# Patient Record
Sex: FEMALE | Race: WHITE | NOT HISPANIC OR LATINO | Employment: FULL TIME | URBAN - NONMETROPOLITAN AREA
[De-identification: names, ages, dates, MRNs, and addresses within clinical notes are randomized per-mention and may not be internally consistent; named-entity substitution may affect disease eponyms.]

---

## 2023-08-30 ENCOUNTER — OFFICE VISIT (OUTPATIENT)
Dept: UROLOGY | Facility: CLINIC | Age: 48
End: 2023-08-30
Payer: COMMERCIAL

## 2023-08-30 ENCOUNTER — HOSPITAL ENCOUNTER (OUTPATIENT)
Dept: GENERAL RADIOLOGY | Facility: HOSPITAL | Age: 48
Discharge: HOME | End: 2023-08-30
Admitting: NURSE PRACTITIONER
Payer: COMMERCIAL

## 2023-08-30 VITALS
SYSTOLIC BLOOD PRESSURE: 123 MMHG | HEIGHT: 66 IN | BODY MASS INDEX: 23.75 KG/M2 | HEART RATE: 86 BPM | DIASTOLIC BLOOD PRESSURE: 85 MMHG | WEIGHT: 147.8 LBS

## 2023-08-30 DIAGNOSIS — R10.30 LOWER ABDOMINAL PAIN: ICD-10-CM

## 2023-08-30 DIAGNOSIS — B37.31 YEAST VAGINITIS: ICD-10-CM

## 2023-08-30 DIAGNOSIS — N30.00 ACUTE CYSTITIS WITHOUT HEMATURIA: ICD-10-CM

## 2023-08-30 DIAGNOSIS — N32.81 OAB (OVERACTIVE BLADDER): ICD-10-CM

## 2023-08-30 DIAGNOSIS — R35.0 FREQUENCY OF URINATION: ICD-10-CM

## 2023-08-30 DIAGNOSIS — N39.0 RECURRENT UTI (URINARY TRACT INFECTION): Primary | ICD-10-CM

## 2023-08-30 DIAGNOSIS — N32.81 DETRUSOR INSTABILITY OF BLADDER: ICD-10-CM

## 2023-08-30 LAB
BILIRUB BLD-MCNC: NEGATIVE MG/DL
CLARITY, POC: CLEAR
COLOR UR: YELLOW
EXPIRATION DATE: NORMAL
GLUCOSE UR STRIP-MCNC: NEGATIVE MG/DL
KETONES UR QL: NEGATIVE
LEUKOCYTE EST, POC: NEGATIVE
Lab: NORMAL
NITRITE UR-MCNC: NEGATIVE MG/ML
PH UR: 6 [PH] (ref 5–8)
PROT UR STRIP-MCNC: NEGATIVE MG/DL
RBC # UR STRIP: NEGATIVE /UL
SP GR UR: 1.01 (ref 1–1.03)
UROBILINOGEN UR QL: NORMAL

## 2023-08-30 PROCEDURE — 81003 URINALYSIS AUTO W/O SCOPE: CPT | Performed by: NURSE PRACTITIONER

## 2023-08-30 PROCEDURE — 87186 SC STD MICRODIL/AGAR DIL: CPT | Performed by: NURSE PRACTITIONER

## 2023-08-30 PROCEDURE — 87086 URINE CULTURE/COLONY COUNT: CPT | Performed by: NURSE PRACTITIONER

## 2023-08-30 PROCEDURE — 99204 OFFICE O/P NEW MOD 45 MIN: CPT | Performed by: NURSE PRACTITIONER

## 2023-08-30 PROCEDURE — 74018 RADEX ABDOMEN 1 VIEW: CPT

## 2023-08-30 NOTE — PROGRESS NOTES
Chief Complaint  Recurrent uti's, Dysuria, and Urinary Urgency (New patient WITH UTI/OAB/DI/)    Subjective          Madhavi Loving presents to [facility] for UTI/OAB/DI  Dysuria   Associated symptoms include frequency and urgency. Pertinent negatives include no chills, flank pain, hematuria, nausea or vomiting.     Mrs. Gail Loving is a VERY pleasant 48-year-old female who presents to clinic today for evaluation as a new patient consult. The patient has been referred to clinic by her PCP with concerns of frequent UTIs and dysuria. ON Initial evaluation in clinic, patient reports numerous other concerns including :Overactive bladder/detrusor instability complicated by her frequency and urgency symptoms. She reports constant bloating and lower abdominal pain and discomfort.  She reports pelvic pressure and suprapubic discomfort. Denies flank pain, back pain. She does not have any CVA tenderness. Denies any episodes of gross hematuria. She denies any episodes of urinary incontinence, or vaginal discharge.     THE  Patient reports the use of multiple antibiotics in the past, initially for bacterial infections in her abdomen with chronic diarrhea, and most recently completing multiple therapy beginning with Macrobid, cefdinir, and  Augmentin 875 mg which she reports completing on Sunday 08/27/2023, for positive Klebsiella pneumonia UTI.  Patient reports, She took nitrofurantoin before she got her culture results. She went to urgent care and they switched her from Macrobid to cefdinir, which did not work and then subsequently to Augmentin.      Did contact patient's PCP's office for urine culture results and there is one MORE documented urine culture from [facility] completed on 08/17/2023 which showed beta-hemolytic Streptococcus group B. There are no other documented positive urine cultures on file. THE patient confirms 5 other Negative urine cultures for which she  HAD been symptomatic.  Her symptoms include frequency, urgency, and pressure. She has to urinate very often. She has bloating.  Prior to this, patient reports she had UTIs every other year. She denies any dysuria or vaginal discharge. Her urinalysis on initial evaluation in clinic today is completely negative for leukocyte esterase, is negative for nitrite, is negative for gross/trace microscopic hematuria. Her PVR- postvoid residual is 0 mL.    The patient reports she had diarrhea for 3 years straight in 2015 while she was stationed in [facility], and subsequently in [place] .  She stopped drinking coffee 6 months ago. She has had diarrhea for 6 months. She has not had any stool cultures since 2020. She has had cryptosporidium. She has had E. coli in the past.    She has an ultrasound of her abdomen and pelvis tomorrow. She denies any gross hematuria or hematochezia. She has not had any CT scans. She had an appointment with her OBGYN 1 month ago and she was told she had a fibroid. She has 1 male child. She denies any incontinence episodes. She drinks 3 to 4 16 ounce bottles of water a day. She urinates every 1 to 2 hours. She does not always urinate when she is frequent at home. She has discomfort in her bladder. She denies any procedures on her bladder besides her OBGYN. She denies any history of kidney stones.She is not diabetic.    The patient reports she has not had an H. pylori test. She mentions she does not have a GI specialist. She rates her pain as a 4 out of 10. She is taking Pyridium. She reports she has discomfort during intercourse. She denies any family history of bladder cancer, breast cancer, or uterine cancer.  She has had negative Pap smears and in the past.    She is relatively healthy with no significant PMHx.    Patient completed a KUB which is also unremarkable.  2 views of her abdomen with no suspicious calcifications.  The source of patient's discomfort not noted on KUB.   "Encouraged to proceed with ultrasound as recommended by her PCP.    IMPRESSION:  No suspicious calcifications identified on today's study.    Active Ambulatory Problems     Diagnosis Date Noted    Recurrent UTI (urinary tract infection) 08/31/2023    Acute cystitis without hematuria 08/31/2023    Detrusor instability of bladder 08/31/2023    Frequency of urination 08/31/2023    Lower abdominal pain 08/31/2023    Yeast vaginitis 08/31/2023     Resolved Ambulatory Problems     Diagnosis Date Noted    No Resolved Ambulatory Problems     Past Medical History:   Diagnosis Date    Recurrent UTI       Objective   Vital Signs:   /85   Pulse 86   Ht 167.6 cm (66\")   Wt 67 kg (147 lb 12.8 oz)   BMI 23.86 kg/mý       ROS:   Review of Systems   Constitutional:  Positive for activity change, appetite change and fatigue. Negative for chills, diaphoresis, fever, unexpected weight gain and unexpected weight loss.   HENT:  Negative for congestion, ear discharge, ear pain, nosebleeds, rhinorrhea, sinus pressure and sore throat.    Eyes:  Negative for blurred vision, double vision, photophobia, pain, redness and visual disturbance.   Respiratory:  Negative for apnea, cough, chest tightness, shortness of breath, wheezing and stridor.    Cardiovascular:  Negative for chest pain and palpitations.   Gastrointestinal:  Positive for abdominal pain and diarrhea. Negative for abdominal distention, constipation, nausea and vomiting.   Endocrine: Negative for polydipsia, polyphagia and polyuria.   Genitourinary:  Positive for dysuria, frequency, pelvic pain, pelvic pressure and urgency. Negative for decreased urine volume, difficulty urinating, dyspareunia, flank pain, genital sores, hematuria, urinary incontinence and vaginal discharge.   Musculoskeletal:  Positive for myalgias. Negative for arthralgias, back pain and joint swelling.   Skin:  Negative for pallor, rash and wound.   Neurological:  Negative for dizziness, tremors, " syncope, weakness, light-headedness, headache, memory problem and confusion.   Hematological:  Does not bruise/bleed easily.   Psychiatric/Behavioral:  Positive for sleep disturbance and stress. Negative for behavioral problems and decreased concentration. The patient is not nervous/anxious.       Physical Exam  Constitutional:       General: She is in acute distress.      Appearance: She is well-developed.   HENT:      Head: Normocephalic and atraumatic.   Eyes:      Pupils: Pupils are equal, round, and reactive to light.   Neck:      Thyroid: No thyromegaly.      Trachea: No tracheal deviation.   Cardiovascular:      Rate and Rhythm: Normal rate and regular rhythm.      Heart sounds: No murmur heard.  Pulmonary:      Effort: Pulmonary effort is normal. No respiratory distress.      Breath sounds: Normal breath sounds. No stridor. No wheezing.   Abdominal:      General: Bowel sounds are normal. There is distension.      Palpations: Abdomen is soft.      Tenderness: There is abdominal tenderness.   Genitourinary:     Labia:         Right: No tenderness.         Left: No tenderness.       Vagina: Normal. No vaginal discharge.      Comments: UURINE FREQUENCY/URGENCY/DYSURIA, pelvic pressure    Musculoskeletal:         General: Tenderness present. No deformity. Normal range of motion.      Cervical back: Normal range of motion.   Skin:     General: Skin is warm and dry.      Capillary Refill: Capillary refill takes less than 2 seconds.      Coloration: Skin is pale.      Findings: No erythema or rash.   Neurological:      Mental Status: She is alert and oriented to person, place, and time.      Cranial Nerves: No cranial nerve deficit.      Sensory: No sensory deficit.      Motor: Weakness present.      Coordination: Coordination normal.   Psychiatric:         Behavior: Behavior normal.         Thought Content: Thought content normal.         Judgment: Judgment normal.      Result Review :               8/30/2023     15:35   Urinalysis   Ketones, UA Negative    Leukocytes, UA Negative      Urine Culture          8/30/2023    15:34   Urine Culture   Urine Culture 50,000 CFU/mL Escherichia coli  P      Details         P Preliminary result                    Assessment and Plan    Problem List Items Addressed This Visit          Gastrointestinal Abdominal     Lower abdominal pain    Relevant Orders    XR abdomen kub (Completed)    Ambulatory Referral to Gastroenterology       Genitourinary and Reproductive     Recurrent UTI (urinary tract infection) - Primary    Relevant Medications    nitrofurantoin, macrocrystal-monohydrate, (Macrobid) 100 MG capsule    fluconazole (DIFLUCAN) 150 MG tablet    ondansetron (Zofran) 4 MG tablet    Acute cystitis without hematuria    Relevant Medications    nitrofurantoin, macrocrystal-monohydrate, (Macrobid) 100 MG capsule    fluconazole (DIFLUCAN) 150 MG tablet    ondansetron (Zofran) 4 MG tablet    Other Relevant Orders    XR abdomen kub (Completed)    Detrusor instability of bladder    Relevant Orders    XR abdomen kub (Completed)    Frequency of urination    Relevant Medications    nitrofurantoin, macrocrystal-monohydrate, (Macrobid) 100 MG capsule    fluconazole (DIFLUCAN) 150 MG tablet    ondansetron (Zofran) 4 MG tablet    Other Relevant Orders    POC Urinalysis Dipstick, Automated (Completed)    Urine Culture - Urine, Urine, Random Void (Completed)    XR abdomen kub (Completed)    Yeast vaginitis    Relevant Medications    fluconazole (DIFLUCAN) 150 MG tablet     Other Visit Diagnoses       OAB (overactive bladder)        Relevant Orders    XR abdomen kub (Completed)                                               ASSESSMENT           Recurrent urinary tract infections/OverActiveBladder/DI     MS BERTHA GOMEZ IS A 48 YEAR OLD Very pleasant and energetic female evaluated in clinic today for  Recurrent UTI's , OAB/DI SYMPTOMS THAT HAVE BEEN ONGOING, and recently becoming very bothersome to  her. She is in NO apparent distress upon exam in clinic today and reports a remarkable improvement in her overall symptoms post 3 different abx(Macrobid, cefdinir, Augmentin), for acute cystitis without hematuria/Klebsiella pneumonia and group B strep.      She reports doing relatively better post her last antibiotic therapy, we discussed prophylaxis, and she would like to continue if indicated.  Upon RE-evaluation today, she has minimal frequency at times. However she still has frequency, urgency, pelvic pressure, suprapubic discomfort and dysuria which has remained very bothersome to her. Although  She is happy to be feeling better she states, she has bouts of chronic diarrhea > 3 years, worsening the last 6 months with pelvic pressure, bloating, and abdominal pain. She has occassional nausea, denies vomiting, denies chills or fevers. She DENIES having any episodes of perineal irritation and yeast. She denies back/Flank pain, she does not have CVA tenderness . She has not had any gross hematuria.  Her urinalysis today is completely negative for any leukocyte esterase, it is negative for nitrites, it is negative for gross/microscopic hematuria.  Her PVR is 0 mL.    Again,  We discussed the types of organisms that are found in the urinary tract indicating that the vast majority are results of the patient's own gastrointestinal timothy.  We discussed how many of the antibiotics that are utilized can actually exacerbate these infections by creating resistant organisms and there is only a very few antibiotics that are concentrated in the urine and do not affect the rectal reservoir nor cause recurrent yeast vaginitis.      We discussed the risk factors for recurrent infections being intercourse in younger patients and atrophic changes in older patients.  We discussed the symptoms that are found including pain, pressure, burning, frequency, urgency suprapubic pain and painful intercourse.  I discussed upper tract symptoms  including fevers, chills, and indicated the workup would be much more aggressive if the patient were to present with recurrent infections in the face of upper tract symptomatology such as fever. I discussed the history of vesicoureteral reflux in young patients and finally chronic renal scarring as a result of such.     Interstitial cystitis-we discussed the diagnosis and management of this condition.  I indicated that it was a multifactorial condition with multifactorial symptomatology, Including frequency, urgency, the sensation of urinary tract infection in the absence of a positive culture, sexual symptomatology including significant dyspareunia.  We discussed treatment options including the importance of making a clinical diagnosis and the cystoscopic findings including Hunner's ulcers etc.  We also discussed medical management including pharmacologic treatment such as amitriptyline, naturopathic treatments such as pumpkin oil and which more aggressive options of Botox injections as well as the relative risks and merits of this.  We also discussed the use of dietary manipulation including the over-the-counter product relief which basically decreases the acid in the urine and avoidance of ascitic-containing foods such as citrus is etc.    ALSO,  we discussed OAB/Detrusor Instability which is an  irritative bladder symptomatology most likely related to factors such as intake of bladder irritants, postinfectious irritation, prolapse, with a very large differential diagnosis.  The mainstay of treatment has been tight cholinergics which basically caused the bladder to have decreased contractility.  We have discussed the side effects of these treatments including dry mouth, double vision, and increasing constipation.          PLAN  We resent her urine for culture. I will call her with results if any bacteria growth.    Will discussed STARTING Macrobid 100 mg PO Daily -Suppressive Therapy IF POSITIVE BACTERIA.     We  discussed rechecking her urine dipstick in another 2 weeks    She HAS BEEN ENCOURAGED TO  increase her p.o. fluid intake to at least 1 to 2 L daily and avoid bladder irritants such as caffeine products, spicy foods, and citrusy foods.     I recommend concomitant probiotics with treatment with antibiotics to protect the rectal reservoir including over-the-counter yogurt preparations to jonas oral pills containing the appropriate probiotics. Patient reports the diligent use of Probiotics.    WE DISCUSSED STARTING MYRBETRIQ 25 MG daily for OAB/DI-DEFERRED    DISCUSSED LOWER TRACT INVESTIGATION, PT HAS BEEN SCHEDULED FOR CYSTOSCOPY ON 11/07/23 WITH ANTHONY CALZADA-RECURRENT UTI VS CYSTITIS VS OAB/DI    WE DISCUSSED UPPER TRACK VIA CT, DEFERRED, WILL GET KUB TODAY    WILL CALL PT WITH RESULTS AND DEFINITIVE PLAN OF CARE     ALSO GI REFERRAL FOR CHRONIC DIARRHEA X 3 YEARS    FOLLOW UP AS DISCUSSED, MAY RETURN SOONER IF NEED BE    Patient is agreeable to plan of care.    Patient reports that she is not currently experiencing any symptoms of urinary incontinence.    BMI is within normal parameters. No other follow-up for BMI required.    RADIOLOGY (CT AND/OR KUB):    CT Abdomen and Pelvis: No results found for this or any previous visit.     CT Stone Protocol: No results found for this or any previous visit.     KUB: No results found for this or any previous visit.       LABS (3 MONTHS):    Office Visit on 08/30/2023   Component Date Value Ref Range Status    Color 08/30/2023 Yellow  Yellow, Straw, Dark Yellow, Jade Final    Clarity, UA 08/30/2023 Clear  Clear Final    Specific Gravity  08/30/2023 1.010  1.005 - 1.030 Final    pH, Urine 08/30/2023 6.0  5.0 - 8.0 Final    Leukocytes 08/30/2023 Negative  Negative Final    Nitrite, UA 08/30/2023 Negative  Negative Final    Protein, POC 08/30/2023 Negative  Negative mg/dL Final    Glucose, UA 08/30/2023 Negative  Negative mg/dL Final    Ketones, UA 08/30/2023 Negative  Negative  Final    Urobilinogen, UA 08/30/2023 Normal  Normal, 0.2 E.U./dL Final    Bilirubin 08/30/2023 Negative  Negative Final    Blood, UA 08/30/2023 Negative  Negative Final    Lot Number 08/30/2023 n   Final    Expiration Date 08/30/2023 n   Final    Urine Culture 08/30/2023 50,000 CFU/mL Escherichia coli (A)   Preliminary        Smoking Cessation Counseling:  Never a smoker.  Patient does not currently use any tobacco products.     Follow Up   Return in about 2 months (around 11/7/2023) for Next scheduled follow up, RECURRENT UTI/DYSURIA/DETRUSOR I, With Dr. Curtis, Cystoscopy/URODYNAMICS.    Patient was given instructions and counseling regarding her condition or for health maintenance advice. Please see specific information pulled into the AVS if appropriate.          This document has been electronically signed by Griselda Cheng-Akwa, APRN   August 31, 2023 17:27 EDT      Dictated Utilizing Dragon Dictation: Part of this note may be an electronic transcription/translation of spoken language to printed text using the Dragon Dictation System.      Transcribed from ambient dictation for Griselda Cheng-Akwa, APRN by Alka Salvador.  08/30/23   17:24 EDT    Patient or patient representative verbalized consent to the visit recording.  I have personally performed the services described in this document as transcribed by the above individual, and it is both accurate and complete.

## 2023-08-30 NOTE — LETTER
August 31, 2023     Genie Yeung NP  [address] [place], [place] [address]    Patient: Madhavi Loving   YOB: 1975   Date of Visit: 8/30/2023       Dear Genie Yeung NP,    Thank you for referring Madhavi Loving to me for evaluation. Below is a copy of my consult note.    If you have questions, please do not hesitate to call me. I look forward to following Madhavi along with you.         Sincerely,        Griselda Cheng-Akwa, APRN        CC: No Recipients    Chief Complaint  Recurrent uti's, Dysuria, and Urinary Urgency (New patient WITH UTI/OAB/DI/)    Subjective     {CC  Problem List  Visit Diagnosis   Encounters  Notes  Medications  Labs  Result Review Imaging  Media :23}     Madhavi Loving presents to [facility] for UTI/OAB/DI  Dysuria   Associated symptoms include frequency and urgency. Pertinent negatives include no flank pain, hematuria, nausea or vomiting.     Mrs. Gail Loving is a pleasant 48-year-old female who presents to clinic today for evaluation as a new patient consult. The patient has been referred to clinic by her PCP with concerns of frequent UTIs and dysuria. Initial evaluation in clinic, patient reports the use of multiple antibiotics in the past, recently completed Augmentin 875 mg on Sunday 08/27/2023. Her urinalysis on initial evaluation in clinic is completely negative for leukocyte esterase, is negative for nitrite, is negative for gross/trace microscopic hematuria. Her PVR is 0 mL. Did contact patient's PCP's office for urine culture results and there is one documented urine culture from [facility] completed on 08/17/2023 which showed beta-hemolytic Streptococcus group B. There are no other documented positive urine cultures. the patient also reports numerous other concerns including overactive bladder/detrusor instability complicated by her frequency and urgency symptoms. She denies pelvic pressure and  suprapubic discomfort. Denies flank pain, back pain. She does not have any CVA tenderness. Denies any episodes of gross hematuria. She denies any episodes of urinary incontinence. Patient's postvoid residual is 0 mL.    The patient reports she had diarrhea for 3 years straight in 2015. She took nitrofurantoin before she got her culture results. She went to urgent care and they switched her from Macrobid to cefdinir, which did not work. She was treated for Klebsiella on 07/12/2023. Her symptoms include frequency, urgency, and pressure. She has to urinate. She has bloating. She has UTIs every other year. She denies any dysuria or vaginal discharge. She has had 6 positive urine cultures.    She stopped drinking coffee 6 months ago. She has had diarrhea for 6 months. She has not had any stool cultures since 2020. She has had cryptosporidium. She has had E. coli in the past.    She has an ultrasound of her abdomen and pelvis tomorrow. She denies any gross hematuria or hematochezia. She has not had any CT scans. She had an appointment with her OBGYN 1 month ago and she was told she had a fibroid. She has 1 male child. She denies any incontinence episodes. She drinks 3 to 4 16 ounce bottles of water a day. She urinates every 1 to 2 hours. She does not always urinate when she is frequent at home. She has discomfort in her bladder. She denies any procedures on her bladder besides her OBGYN. She denies any history of kidney stones.    She is not diabetic.    The patient reports she has not had an H. pylori test. She mentions she does not have a GI specialist. She rates her pain as a 4 out of 10. She is taking Pyridium. She reports she has discomfort during intercourse. She denies any family history of bladder cancer, breast cancer, or uterine cancer.    Active Ambulatory Problems     Diagnosis Date Noted    No Active Ambulatory Problems     Resolved Ambulatory Problems     Diagnosis Date Noted    No Resolved Ambulatory  "Problems     Past Medical History:   Diagnosis Date    Recurrent UTI       Objective   Vital Signs:   /85   Pulse 86   Ht 167.6 cm (66\")   Wt 67 kg (147 lb 12.8 oz)   BMI 23.86 kg/mý       ROS:   Review of Systems   Constitutional:  Negative for activity change, appetite change, diaphoresis, fatigue, fever, unexpected weight gain and unexpected weight loss.   HENT:  Negative for congestion, ear discharge, ear pain, nosebleeds, rhinorrhea, sinus pressure and sore throat.    Eyes:  Negative for blurred vision, double vision, photophobia, pain, redness and visual disturbance.   Respiratory:  Negative for apnea, cough, chest tightness, shortness of breath, wheezing and stridor.    Cardiovascular:  Negative for chest pain and palpitations.   Gastrointestinal:  Positive for abdominal pain and diarrhea. Negative for abdominal distention, constipation, nausea and vomiting.   Endocrine: Negative for polydipsia, polyphagia and polyuria.   Genitourinary:  Positive for dysuria, frequency and urgency. Negative for decreased urine volume, difficulty urinating, flank pain, hematuria, pelvic pain, pelvic pressure and urinary incontinence.   Musculoskeletal:  Negative for arthralgias, back pain and joint swelling.   Skin:  Negative for pallor, rash and wound.   Neurological:  Negative for dizziness, tremors, syncope, weakness, light-headedness, headache, memory problem and confusion.   Hematological:  Does not bruise/bleed easily.   Psychiatric/Behavioral:  Negative for behavioral problems. The patient is not nervous/anxious.       Physical Exam  Constitutional:       General: She is in acute distress.      Appearance: She is well-developed.   HENT:      Head: Normocephalic and atraumatic.   Eyes:      Pupils: Pupils are equal, round, and reactive to light.   Neck:      Thyroid: No thyromegaly.      Trachea: No tracheal deviation.   Cardiovascular:      Rate and Rhythm: Normal rate and regular rhythm.      Heart sounds: " No murmur heard.  Pulmonary:      Effort: Pulmonary effort is normal. No respiratory distress.      Breath sounds: Normal breath sounds. No stridor. No wheezing.   Abdominal:      General: Bowel sounds are normal.      Palpations: Abdomen is soft.      Tenderness: There is abdominal tenderness.   Genitourinary:     Labia:         Right: No tenderness.         Left: No tenderness.       Vagina: Normal. No vaginal discharge.      Comments: UURINE FREQUENCY/URGENCY/DYSURIA    Musculoskeletal:         General: Tenderness present. No deformity. Normal range of motion.      Cervical back: Normal range of motion.   Skin:     General: Skin is warm and dry.      Capillary Refill: Capillary refill takes less than 2 seconds.      Coloration: Skin is pale.      Findings: No erythema or rash.   Neurological:      Mental Status: She is alert and oriented to person, place, and time.      Cranial Nerves: No cranial nerve deficit.      Sensory: No sensory deficit.      Motor: Weakness present.      Coordination: Coordination normal.   Psychiatric:         Behavior: Behavior normal.         Thought Content: Thought content normal.         Judgment: Judgment normal.      Result Review :{ Labs  Result Review  Imaging  Med Tab  Media :23}   {The following data was reviewed by (Optional):[id number]}  LIDA          8/30/2023    15:35   Urinalysis   Ketones, UA Negative    Leukocytes, UA Negative        {Data reviewed (Optional):74179:::1}       Assessment and Plan {CC Problem List  Visit Diagnosis  ROS  Review (Popup)  Health Maintenance  Quality  BestPractice  Medications  SmartSets  SnapShot Encounters  Media :23}   Problem List Items Addressed This Visit    None  Visit Diagnoses       Acute cystitis without hematuria    -  Primary    Relevant Orders    XR abdomen kub    Detrusor instability of bladder        Relevant Orders    XR abdomen kub    Frequency of urination        Relevant Orders    POC Urinalysis Dipstick, Automated  (Completed)    Urine Culture - Urine, Urine, Random Void    XR abdomen kub    OAB (overactive bladder)        Relevant Orders    XR abdomen kub    Lower abdominal pain        Relevant Orders    XR abdomen kub    Ambulatory Referral to Gastroenterology                                               ASSESSMENT  Recurrent urinary tract infections/OverActiveBladder:     MS BERTHA GOMEZ IS A 48 YEAR OLD Very pleasant and energetic female evaluated in clinic today for  Recurrent UTI's , OAB/DI SYMPTOMS THAT HAVE BEEN ONGOING, and recently becoming very bothersome to her. She is in no apparent distress and reports a remarkable improvement in her overall symptoms post 3 different abx, however she still has frequency, urgency and dysuria which has remained very bothersome to her. Although  She is happy to be feeling better she states, she has bouts of chronic diarrhea > 3 years, pelvic pressure, bloating, and abdominal pain. She has occassional nausea, denies vomittibg, denies chills or fevers.  Her     She reports doing relatively better on her antibiotic prophylaxis, and would like to continue.  Upon evaluation today, she has minimal frequency at times.  However, she DENIES having any more episodes of perineal irritation and yeast, or urine urgency. She denies back/abdominal pain, flank pain, she does not have CVA tenderness or pain at her pubic area. She has no urinary symptoms of dysuria, or burning on urination. She has not had any gross hematuria. She denies N/V/D.     Again,  We discussed the types of organisms that are found in the urinary tract indicating that the vast majority are results of the patient's own gastrointestinal timothy.  We discussed how many of the antibiotics that are utilized can actually exacerbate these infections by creating resistant organisms and there is only a very few antibiotics that are concentrated in the urine and do not affect the rectal reservoir nor cause recurrent yeast vaginitis.       We discussed the risk factors for recurrent infections being intercourse in younger patients and atrophic changes in older patients.  We discussed the symptoms that are found including pain, pressure, burning, frequency, urgency suprapubic pain and painful intercourse.  I discussed upper tract symptoms including fevers, chills, and indicated the workup would be much more aggressive if the patient were to present with recurrent infections in the face of upper tract symptomatology such as fever. I discussed the history of vesicoureteral reflux in young patients and finally chronic renal scarring as a result of such.     Interstitial cystitis-we discussed the diagnosis and management of this condition.  I indicated that it was a multifactorial condition with multifactorial symptomatology, Including frequency, urgency, the sensation of urinary tract infection in the absence of a positive culture, sexual symptomatology including significant dyspareunia.  We discussed treatment options including the importance of making a clinical diagnosis and the cystoscopic findings including Hunner's ulcers etc.  We also discussed medical management including pharmacologic treatment such as amitriptyline, naturopathic treatments such as pumpkin oil and which more aggressive options of Botox injections as well as the relative risks and merits of this.  We also discussed the use of dietary manipulation including the over-the-counter product relief which basically decreases the acid in the urine and avoidance of ascitic-containing foods such as citrus is etc.            PLAN  We resent her urine for culture. I will call her with results if any bacteria growth.    Will START Macrobid 100 mg PO Daily -Suppressive Therapy.     She HAS BEEN ENCOURAGED TO  increase her p.o. fluid intake to at least 1 to 2 L daily and avoid bladder irritants such as caffeine products, spicy foods, and citrusy foods.     I recommend concomitant probiotics  with treatment with antibiotics to protect the rectal reservoir including over-the-counter yogurt preparations to jonas oral pills containing the appropriate probiotics. Patient reports the diligent use of Probiotics.    WE DISCUSSED STARTING MYRBETRIQ 25 MG daily for OAB-DEFERRED    DISCUSSED LOWER TRACT INVESTIGATION, PT HAS BEEN SCHEDULED FOR CYSTOSCOPY ON 11/07/23 WITH ANTHONY CALZADA-RECURRENT UTI VS CYSTITIS VS OAB/DI    WE DISCUSSED UPPER TRACK VIAS CT, DEFERRED, WILL GET KUB TODAY    WILL CALL PT WITH RESULTS AND DEFINITIVE PLAN OF CARE     ALSO GI REFERRAL FOR CHRONIC DIARRHEA X 3 YEARS    FOLLOW UP AS DISCUSSED, MAY RETURN SOONER IF NEED BE    Patient is agreeable to plan of care.    Patient reports that she is not currently experiencing any symptoms of urinary incontinence.    BMI is within normal parameters. No other follow-up for BMI required.    RADIOLOGY (CT AND/OR KUB):    CT Abdomen and Pelvis: No results found for this or any previous visit.     CT Stone Protocol: No results found for this or any previous visit.     KUB: No results found for this or any previous visit.       LABS (3 MONTHS):    Office Visit on 08/30/2023   Component Date Value Ref Range Status    Color 08/30/2023 Yellow  Yellow, Straw, Dark Yellow, Jade Final    Clarity, UA 08/30/2023 Clear  Clear Final    Specific Gravity  08/30/2023 1.010  1.005 - 1.030 Final    pH, Urine 08/30/2023 6.0  5.0 - 8.0 Final    Leukocytes 08/30/2023 Negative  Negative Final    Nitrite, UA 08/30/2023 Negative  Negative Final    Protein, POC 08/30/2023 Negative  Negative mg/dL Final    Glucose, UA 08/30/2023 Negative  Negative mg/dL Final    Ketones, UA 08/30/2023 Negative  Negative Final    Urobilinogen, UA 08/30/2023 Normal  Normal, 0.2 E.U./dL Final    Bilirubin 08/30/2023 Negative  Negative Final    Blood, UA 08/30/2023 Negative  Negative Final    Lot Number 08/30/2023 n   Final    Expiration Date 08/30/2023 n   Final        Smoking  "Cessation Counseling:  Never a smoker.  Patient does not currently use any tobacco products.     During this visit, I spent *** minutes counseling Madhavi regarding tobacco cessation.    {Time Spent (Optional):81884}    Follow Up {Instructions Charge Capture  Follow-up Communications :23}  No follow-ups on file.    Patient was given instructions and counseling regarding her condition or for health maintenance advice. Please see specific information pulled into the AVS if appropriate.          This document has been electronically signed by Griselda Cheng-Akwa, APRN   August 30, 2023 16:35 EDT      Dictated Utilizing Dragon Dictation: Part of this note may be an electronic transcription/translation of spoken language to printed text using the Dragon Dictation System.               Transcribed from ambient dictation for Griselda Cheng-Akwa, APRN by Alka Salvador.  08/30/23   17:24 EDT    {LATISHA Provider Statement:18352::\"Patient or patient representative verbalized consent to the visit recording.\",\"I have personally performed the services described in this document as transcribed by the above individual, and it is both accurate and complete.\"}       "

## 2023-08-30 NOTE — LETTER
August 31, 2023     Genie Yeung NP  [address] [place] [address]    Patient: Madhavi Loving   YOB: 1975   Date of Visit: 8/30/2023       Dear Genie Yeung NP,    Thank you for referring Madhavi Loving to me for evaluation. Below is a copy of my consult note.    If you have questions, please do not hesitate to call me. I look forward to following Madhavi along with you.         Sincerely,        Griselda Cheng-Akwa, APRN        CC: No Recipients    Chief Complaint  Recurrent uti's, Dysuria, and Urinary Urgency (New patient WITH UTI/OAB/DI/)    Subjective          Madhavi Loving presents to [facility] for UTI/OAB/DI  Dysuria   Associated symptoms include frequency and urgency. Pertinent negatives include no chills, flank pain, hematuria, nausea or vomiting.     Mrs. Gail Loving is a VERY pleasant 48-year-old female who presents to clinic today for evaluation as a new patient consult. The patient has been referred to clinic by her PCP with concerns of frequent UTIs and dysuria. ON Initial evaluation in clinic, patient reports numerous other concerns including :Overactive bladder/detrusor instability complicated by her frequency and urgency symptoms. She reports constant bloating and lower abdominal pain and discomfort.  She reports pelvic pressure and suprapubic discomfort. Denies flank pain, back pain. She does not have any CVA tenderness. Denies any episodes of gross hematuria. She denies any episodes of urinary incontinence, or vaginal discharge.     THE  Patient reports the use of multiple antibiotics in the past, initially for bacterial infections in her abdomen with chronic diarrhea, and most recently completing multiple therapy beginning with Macrobid, cefdinir, and  Augmentin 875 mg which she reports completing on Sunday 08/27/2023, for positive Klebsiella pneumonia UTI.  Patient reports, She took nitrofurantoin before she got  her culture results. She went to urgent care and they switched her from Macrobid to cefdinir, which did not work and then subsequently to Augmentin.      Did contact patient's PCP's office for urine culture results and there is one MORE documented urine culture from [facility] completed on 08/17/2023 which showed beta-hemolytic Streptococcus group B. There are no other documented positive urine cultures on file. THE patient confirms 5 other Negative urine cultures for which she HAD been symptomatic.  Her symptoms include frequency, urgency, and pressure. She has to urinate very often. She has bloating.  Prior to this, patient reports she had UTIs every other year. She denies any dysuria or vaginal discharge. Her urinalysis on initial evaluation in clinic today is completely negative for leukocyte esterase, is negative for nitrite, is negative for gross/trace microscopic hematuria. Her PVR- postvoid residual is 0 mL.    The patient reports she had diarrhea for 3 years straight in 2015 while she was stationed in [facility], and subsequently in [place] .  She stopped drinking coffee 6 months ago. She has had diarrhea for 6 months. She has not had any stool cultures since 2020. She has had cryptosporidium. She has had E. coli in the past.    She has an ultrasound of her abdomen and pelvis tomorrow. She denies any gross hematuria or hematochezia. She has not had any CT scans. She had an appointment with her OBGYN 1 month ago and she was told she had a fibroid. She has 1 male child. She denies any incontinence episodes. She drinks 3 to 4 16 ounce bottles of water a day. She urinates every 1 to 2 hours. She does not always urinate when she is frequent at home. She has discomfort in her bladder. She denies any procedures on her bladder besides her OBGYN. She denies any history of kidney stones.She is not diabetic.    The patient reports she has not had an H. pylori test. She mentions she does not have a  "GI specialist. She rates her pain as a 4 out of 10. She is taking Pyridium. She reports she has discomfort during intercourse. She denies any family history of bladder cancer, breast cancer, or uterine cancer.  She has had negative Pap smears and in the past.    She is relatively healthy with no significant PMHx.    Patient completed a KUB which is also unremarkable.  2 views of her abdomen with no suspicious calcifications.  The source of patient's discomfort not noted on KUB.  Encouraged to proceed with ultrasound as recommended by her PCP.    IMPRESSION:  No suspicious calcifications identified on today's study.    Active Ambulatory Problems     Diagnosis Date Noted    Recurrent UTI (urinary tract infection) 08/31/2023    Acute cystitis without hematuria 08/31/2023    Detrusor instability of bladder 08/31/2023    Frequency of urination 08/31/2023    Lower abdominal pain 08/31/2023    Yeast vaginitis 08/31/2023     Resolved Ambulatory Problems     Diagnosis Date Noted    No Resolved Ambulatory Problems     Past Medical History:   Diagnosis Date    Recurrent UTI       Objective   Vital Signs:   /85   Pulse 86   Ht 167.6 cm (66\")   Wt 67 kg (147 lb 12.8 oz)   BMI 23.86 kg/mý       ROS:   Review of Systems   Constitutional:  Positive for activity change, appetite change and fatigue. Negative for chills, diaphoresis, fever, unexpected weight gain and unexpected weight loss.   HENT:  Negative for congestion, ear discharge, ear pain, nosebleeds, rhinorrhea, sinus pressure and sore throat.    Eyes:  Negative for blurred vision, double vision, photophobia, pain, redness and visual disturbance.   Respiratory:  Negative for apnea, cough, chest tightness, shortness of breath, wheezing and stridor.    Cardiovascular:  Negative for chest pain and palpitations.   Gastrointestinal:  Positive for abdominal pain and diarrhea. Negative for abdominal distention, constipation, nausea and vomiting.   Endocrine: " Negative for polydipsia, polyphagia and polyuria.   Genitourinary:  Positive for dysuria, frequency, pelvic pain, pelvic pressure and urgency. Negative for decreased urine volume, difficulty urinating, dyspareunia, flank pain, genital sores, hematuria, urinary incontinence and vaginal discharge.   Musculoskeletal:  Positive for myalgias. Negative for arthralgias, back pain and joint swelling.   Skin:  Negative for pallor, rash and wound.   Neurological:  Negative for dizziness, tremors, syncope, weakness, light-headedness, headache, memory problem and confusion.   Hematological:  Does not bruise/bleed easily.   Psychiatric/Behavioral:  Positive for sleep disturbance and stress. Negative for behavioral problems and decreased concentration. The patient is not nervous/anxious.       Physical Exam  Constitutional:       General: She is in acute distress.      Appearance: She is well-developed.   HENT:      Head: Normocephalic and atraumatic.   Eyes:      Pupils: Pupils are equal, round, and reactive to light.   Neck:      Thyroid: No thyromegaly.      Trachea: No tracheal deviation.   Cardiovascular:      Rate and Rhythm: Normal rate and regular rhythm.      Heart sounds: No murmur heard.  Pulmonary:      Effort: Pulmonary effort is normal. No respiratory distress.      Breath sounds: Normal breath sounds. No stridor. No wheezing.   Abdominal:      General: Bowel sounds are normal. There is distension.      Palpations: Abdomen is soft.      Tenderness: There is abdominal tenderness.   Genitourinary:     Labia:         Right: No tenderness.         Left: No tenderness.       Vagina: Normal. No vaginal discharge.      Comments: UURINE FREQUENCY/URGENCY/DYSURIA, pelvic pressure    Musculoskeletal:         General: Tenderness present. No deformity. Normal range of motion.      Cervical back: Normal range of motion.   Skin:     General: Skin is warm and dry.      Capillary Refill: Capillary refill takes less than 2 seconds.       Coloration: Skin is pale.      Findings: No erythema or rash.   Neurological:      Mental Status: She is alert and oriented to person, place, and time.      Cranial Nerves: No cranial nerve deficit.      Sensory: No sensory deficit.      Motor: Weakness present.      Coordination: Coordination normal.   Psychiatric:         Behavior: Behavior normal.         Thought Content: Thought content normal.         Judgment: Judgment normal.      Result Review :     UA          8/30/2023    15:35   Urinalysis   Ketones, UA Negative    Leukocytes, UA Negative      Urine Culture          8/30/2023    15:34   Urine Culture   Urine Culture 50,000 CFU/mL Escherichia coli  P      Details         P Preliminary result                    Assessment and Plan    Problem List Items Addressed This Visit          Gastrointestinal Abdominal     Lower abdominal pain    Relevant Orders    XR abdomen kub (Completed)    Ambulatory Referral to Gastroenterology       Genitourinary and Reproductive     Recurrent UTI (urinary tract infection) - Primary    Relevant Medications    nitrofurantoin, macrocrystal-monohydrate, (Macrobid) 100 MG capsule    fluconazole (DIFLUCAN) 150 MG tablet    ondansetron (Zofran) 4 MG tablet    Acute cystitis without hematuria    Relevant Medications    nitrofurantoin, macrocrystal-monohydrate, (Macrobid) 100 MG capsule    fluconazole (DIFLUCAN) 150 MG tablet    ondansetron (Zofran) 4 MG tablet    Other Relevant Orders    XR abdomen kub (Completed)    Detrusor instability of bladder    Relevant Orders    XR abdomen kub (Completed)    Frequency of urination    Relevant Medications    nitrofurantoin, macrocrystal-monohydrate, (Macrobid) 100 MG capsule    fluconazole (DIFLUCAN) 150 MG tablet    ondansetron (Zofran) 4 MG tablet    Other Relevant Orders    POC Urinalysis Dipstick, Automated (Completed)    Urine Culture - Urine, Urine, Random Void (Completed)    XR abdomen kub (Completed)    Yeast vaginitis    Relevant  Medications    fluconazole (DIFLUCAN) 150 MG tablet     Other Visit Diagnoses       OAB (overactive bladder)        Relevant Orders    XR abdomen kub (Completed)                                               ASSESSMENT           Recurrent urinary tract infections/OverActiveBladder/DI     MS BERTHA GOMEZ IS A 48 YEAR OLD Very pleasant and energetic female evaluated in clinic today for  Recurrent UTI's , OAB/DI SYMPTOMS THAT HAVE BEEN ONGOING, and recently becoming very bothersome to her. She is in NO apparent distress upon exam in clinic today and reports a remarkable improvement in her overall symptoms post 3 different abx(Macrobid, cefdinir, Augmentin), for acute cystitis without hematuria/Klebsiella pneumonia and group B strep.      She reports doing relatively better post her last antibiotic therapy, we discussed prophylaxis, and she would like to continue if indicated.  Upon RE-evaluation today, she has minimal frequency at times. However she still has frequency, urgency, pelvic pressure, suprapubic discomfort and dysuria which has remained very bothersome to her. Although  She is happy to be feeling better she states, she has bouts of chronic diarrhea > 3 years, worsening the last 6 months with pelvic pressure, bloating, and abdominal pain. She has occassional nausea, denies vomiting, denies chills or fevers. She DENIES having any episodes of perineal irritation and yeast. She denies back/Flank pain, she does not have CVA tenderness . She has not had any gross hematuria.  Her urinalysis today is completely negative for any leukocyte esterase, it is negative for nitrites, it is negative for gross/microscopic hematuria.  Her PVR is 0 mL.    Again,  We discussed the types of organisms that are found in the urinary tract indicating that the vast majority are results of the patient's own gastrointestinal timothy.  We discussed how many of the antibiotics that are utilized can actually exacerbate these infections by  creating resistant organisms and there is only a very few antibiotics that are concentrated in the urine and do not affect the rectal reservoir nor cause recurrent yeast vaginitis.      We discussed the risk factors for recurrent infections being intercourse in younger patients and atrophic changes in older patients.  We discussed the symptoms that are found including pain, pressure, burning, frequency, urgency suprapubic pain and painful intercourse.  I discussed upper tract symptoms including fevers, chills, and indicated the workup would be much more aggressive if the patient were to present with recurrent infections in the face of upper tract symptomatology such as fever. I discussed the history of vesicoureteral reflux in young patients and finally chronic renal scarring as a result of such.     Interstitial cystitis-we discussed the diagnosis and management of this condition.  I indicated that it was a multifactorial condition with multifactorial symptomatology, Including frequency, urgency, the sensation of urinary tract infection in the absence of a positive culture, sexual symptomatology including significant dyspareunia.  We discussed treatment options including the importance of making a clinical diagnosis and the cystoscopic findings including Hunner's ulcers etc.  We also discussed medical management including pharmacologic treatment such as amitriptyline, naturopathic treatments such as pumpkin oil and which more aggressive options of Botox injections as well as the relative risks and merits of this.  We also discussed the use of dietary manipulation including the over-the-counter product relief which basically decreases the acid in the urine and avoidance of ascitic-containing foods such as citrus is etc.    ALSO,  we discussed OAB/Detrusor Instability which is an  irritative bladder symptomatology most likely related to factors such as intake of bladder irritants, postinfectious irritation, prolapse,  with a very large differential diagnosis.  The mainstay of treatment has been tight cholinergics which basically caused the bladder to have decreased contractility.  We have discussed the side effects of these treatments including dry mouth, double vision, and increasing constipation.          PLAN  We resent her urine for culture. I will call her with results if any bacteria growth.    Will discussed STARTING Macrobid 100 mg PO Daily -Suppressive Therapy IF POSITIVE BACTERIA.     We discussed rechecking her urine dipstick in another 2 weeks    She HAS BEEN ENCOURAGED TO  increase her p.o. fluid intake to at least 1 to 2 L daily and avoid bladder irritants such as caffeine products, spicy foods, and citrusy foods.     I recommend concomitant probiotics with treatment with antibiotics to protect the rectal reservoir including over-the-counter yogurt preparations to jonas oral pills containing the appropriate probiotics. Patient reports the diligent use of Probiotics.    WE DISCUSSED STARTING MYRBETRIQ 25 MG daily for OAB/DI-DEFERRED    DISCUSSED LOWER TRACT INVESTIGATION, PT HAS BEEN SCHEDULED FOR CYSTOSCOPY ON 11/07/23 WITH ANTHONY CALZADA-RECURRENT UTI VS CYSTITIS VS OAB/DI    WE DISCUSSED UPPER TRACK VIA CT, DEFERRED, WILL GET KUB TODAY    WILL CALL PT WITH RESULTS AND DEFINITIVE PLAN OF CARE     ALSO GI REFERRAL FOR CHRONIC DIARRHEA X 3 YEARS    FOLLOW UP AS DISCUSSED, MAY RETURN SOONER IF NEED BE    Patient is agreeable to plan of care.    Patient reports that she is not currently experiencing any symptoms of urinary incontinence.    BMI is within normal parameters. No other follow-up for BMI required.    RADIOLOGY (CT AND/OR KUB):    CT Abdomen and Pelvis: No results found for this or any previous visit.     CT Stone Protocol: No results found for this or any previous visit.     KUB: No results found for this or any previous visit.       LABS (3 MONTHS):    Office Visit on 08/30/2023   Component Date Value Ref  Range Status    Color 08/30/2023 Yellow  Yellow, Straw, Dark Yellow, Jade Final    Clarity, UA 08/30/2023 Clear  Clear Final    Specific Gravity  08/30/2023 1.010  1.005 - 1.030 Final    pH, Urine 08/30/2023 6.0  5.0 - 8.0 Final    Leukocytes 08/30/2023 Negative  Negative Final    Nitrite, UA 08/30/2023 Negative  Negative Final    Protein, POC 08/30/2023 Negative  Negative mg/dL Final    Glucose, UA 08/30/2023 Negative  Negative mg/dL Final    Ketones, UA 08/30/2023 Negative  Negative Final    Urobilinogen, UA 08/30/2023 Normal  Normal, 0.2 E.U./dL Final    Bilirubin 08/30/2023 Negative  Negative Final    Blood, UA 08/30/2023 Negative  Negative Final    Lot Number 08/30/2023 n   Final    Expiration Date 08/30/2023 n   Final    Urine Culture 08/30/2023 50,000 CFU/mL Escherichia coli (A)   Preliminary        Smoking Cessation Counseling:  Never a smoker.  Patient does not currently use any tobacco products.     Follow Up   Return in about 2 months (around 11/7/2023) for Next scheduled follow up, RECURRENT UTI/DYSURIA/DETRUSOR I, With Dr. Curtis, Cystoscopy/URODYNAMICS.    Patient was given instructions and counseling regarding her condition or for health maintenance advice. Please see specific information pulled into the AVS if appropriate.          This document has been electronically signed by Griselda Cheng-Akwa, APRN   August 31, 2023 17:27 EDT      Dictated Utilizing Dragon Dictation: Part of this note may be an electronic transcription/translation of spoken language to printed text using the Dragon Dictation System.      Transcribed from ambient dictation for Griselda Cheng-Akwa, APRN by Alka Salvador.  08/30/23   17:24 EDT    Patient or patient representative verbalized consent to the visit recording.  I have personally performed the services described in this document as transcribed by the above individual, and it is both accurate and complete.

## 2023-08-30 NOTE — LETTER
August 31, 2023     Genie Yeung NP  [address] [place], [place] [address]    Patient: Madhavi Loving   YOB: 1975   Date of Visit: 8/30/2023       Dear Genie Yeung NP,    Thank you for referring Madhavi Loving to me for evaluation. Below is a copy of my consult note.    If you have questions, please do not hesitate to call me. I look forward to following Madhavi along with you.         Sincerely,        Griselda Cheng-Akwa, APRN        CC: No Recipients    Chief Complaint  Recurrent uti's, Dysuria, and Urinary Urgency (New patient WITH UTI/OAB/DI/)    Subjective     {CC  Problem List  Visit Diagnosis   Encounters  Notes  Medications  Labs  Result Review Imaging  Media :23}     Madhavi Loving presents to [facility] for UTI/OAB/DI  Dysuria   Associated symptoms include frequency and urgency. Pertinent negatives include no flank pain, hematuria, nausea or vomiting.     Mrs. Gail Loving is a pleasant 48-year-old female who presents to clinic today for evaluation as a new patient consult. The patient has been referred to clinic by her PCP with concerns of frequent UTIs and dysuria. Initial evaluation in clinic, patient reports the use of multiple antibiotics in the past, recently completed Augmentin 875 mg on Sunday 08/27/2023. Her urinalysis on initial evaluation in clinic is completely negative for leukocyte esterase, is negative for nitrite, is negative for gross/trace microscopic hematuria. Her PVR is 0 mL. Did contact patient's PCP's office for urine culture results and there is one documented urine culture from [facility] completed on 08/17/2023 which showed beta-hemolytic Streptococcus group B. There are no other documented positive urine cultures. the patient also reports numerous other concerns including overactive bladder/detrusor instability complicated by her frequency and urgency symptoms. She denies pelvic pressure and  suprapubic discomfort. Denies flank pain, back pain. She does not have any CVA tenderness. Denies any episodes of gross hematuria. She denies any episodes of urinary incontinence. Patient's postvoid residual is 0 mL.    The patient reports she had diarrhea for 3 years straight in 2015. She took nitrofurantoin before she got her culture results. She went to urgent care and they switched her from Macrobid to cefdinir, which did not work. She was treated for Klebsiella on 07/12/2023. Her symptoms include frequency, urgency, and pressure. She has to urinate. She has bloating. She has UTIs every other year. She denies any dysuria or vaginal discharge. She has had 6 positive urine cultures.    She stopped drinking coffee 6 months ago. She has had diarrhea for 6 months. She has not had any stool cultures since 2020. She has had cryptosporidium. She has had E. coli in the past.    She has an ultrasound of her abdomen and pelvis tomorrow. She denies any gross hematuria or hematochezia. She has not had any CT scans. She had an appointment with her OBGYN 1 month ago and she was told she had a fibroid. She has 1 male child. She denies any incontinence episodes. She drinks 3 to 4 16 ounce bottles of water a day. She urinates every 1 to 2 hours. She does not always urinate when she is frequent at home. She has discomfort in her bladder. She denies any procedures on her bladder besides her OBGYN. She denies any history of kidney stones.    She is not diabetic.    The patient reports she has not had an H. pylori test. She mentions she does not have a GI specialist. She rates her pain as a 4 out of 10. She is taking Pyridium. She reports she has discomfort during intercourse. She denies any family history of bladder cancer, breast cancer, or uterine cancer.    Active Ambulatory Problems     Diagnosis Date Noted    No Active Ambulatory Problems     Resolved Ambulatory Problems     Diagnosis Date Noted    No Resolved Ambulatory  "Problems     Past Medical History:   Diagnosis Date    Recurrent UTI       Objective   Vital Signs:   /85   Pulse 86   Ht 167.6 cm (66\")   Wt 67 kg (147 lb 12.8 oz)   BMI 23.86 kg/mý       ROS:   Review of Systems   Constitutional:  Negative for activity change, appetite change, diaphoresis, fatigue, fever, unexpected weight gain and unexpected weight loss.   HENT:  Negative for congestion, ear discharge, ear pain, nosebleeds, rhinorrhea, sinus pressure and sore throat.    Eyes:  Negative for blurred vision, double vision, photophobia, pain, redness and visual disturbance.   Respiratory:  Negative for apnea, cough, chest tightness, shortness of breath, wheezing and stridor.    Cardiovascular:  Negative for chest pain and palpitations.   Gastrointestinal:  Positive for abdominal pain and diarrhea. Negative for abdominal distention, constipation, nausea and vomiting.   Endocrine: Negative for polydipsia, polyphagia and polyuria.   Genitourinary:  Positive for dysuria, frequency and urgency. Negative for decreased urine volume, difficulty urinating, flank pain, hematuria, pelvic pain, pelvic pressure and urinary incontinence.   Musculoskeletal:  Negative for arthralgias, back pain and joint swelling.   Skin:  Negative for pallor, rash and wound.   Neurological:  Negative for dizziness, tremors, syncope, weakness, light-headedness, headache, memory problem and confusion.   Hematological:  Does not bruise/bleed easily.   Psychiatric/Behavioral:  Negative for behavioral problems. The patient is not nervous/anxious.       Physical Exam  Constitutional:       General: She is in acute distress.      Appearance: She is well-developed.   HENT:      Head: Normocephalic and atraumatic.   Eyes:      Pupils: Pupils are equal, round, and reactive to light.   Neck:      Thyroid: No thyromegaly.      Trachea: No tracheal deviation.   Cardiovascular:      Rate and Rhythm: Normal rate and regular rhythm.      Heart sounds: No " murmur heard.  Pulmonary:      Effort: Pulmonary effort is normal. No respiratory distress.      Breath sounds: Normal breath sounds. No stridor. No wheezing.   Abdominal:      General: Bowel sounds are normal.      Palpations: Abdomen is soft.      Tenderness: There is abdominal tenderness.   Genitourinary:     Labia:         Right: No tenderness.         Left: No tenderness.       Vagina: Normal. No vaginal discharge.      Comments: UURINE FREQUENCY/URGENCY/DYSURIA    Musculoskeletal:         General: Tenderness present. No deformity. Normal range of motion.      Cervical back: Normal range of motion.   Skin:     General: Skin is warm and dry.      Capillary Refill: Capillary refill takes less than 2 seconds.      Coloration: Skin is pale.      Findings: No erythema or rash.   Neurological:      Mental Status: She is alert and oriented to person, place, and time.      Cranial Nerves: No cranial nerve deficit.      Sensory: No sensory deficit.      Motor: Weakness present.      Coordination: Coordination normal.   Psychiatric:         Behavior: Behavior normal.         Thought Content: Thought content normal.         Judgment: Judgment normal.      Result Review :{ Labs  Result Review  Imaging  Med Tab  Media :23}   {The following data was reviewed by (Optional):[id number]}  LIDA          8/30/2023    15:35   Urinalysis   Ketones, UA Negative    Leukocytes, UA Negative        {Data reviewed (Optional):53155:::1}       Assessment and Plan {CC Problem List  Visit Diagnosis  ROS  Review (Popup)  Health Maintenance  Quality  BestPractice  Medications  SmartSets  SnapShot Encounters  Media :23}   Problem List Items Addressed This Visit    None  Visit Diagnoses       Acute cystitis without hematuria    -  Primary    Relevant Orders    XR abdomen kub    Detrusor instability of bladder        Relevant Orders    XR abdomen kub    Frequency of urination        Relevant Orders    POC Urinalysis Dipstick, Automated  (Completed)    Urine Culture - Urine, Urine, Random Void    XR abdomen kub    OAB (overactive bladder)        Relevant Orders    XR abdomen kub    Lower abdominal pain        Relevant Orders    XR abdomen kub    Ambulatory Referral to Gastroenterology                                               ASSESSMENT  Recurrent urinary tract infections/OverActiveBladder:     MS BERTHA GOMEZ IS A 48 YEAR OLD Very pleasant and energetic female evaluated in clinic today for  Recurrent UTI's , OAB/DI SYMPTOMS THAT HAVE BEEN ONGOING, and recently becoming very bothersome to her. She is in no apparent distress and reports a remarkable improvement in her overall symptoms post 3 different abx, however she still has frequency, urgency and dysuria which has remained very bothersome to her. Although  She is happy to be feeling better she states, she has bouts of chronic diarrhea > 3 years, pelvic pressure, bloating, and abdominal pain. She has occassional nausea, denies vomittibg, denies chills or fevers.  Her     She reports doing relatively better on her antibiotic prophylaxis, and would like to continue.  Upon evaluation today, she has minimal frequency at times.  However, she DENIES having any more episodes of perineal irritation and yeast, or urine urgency. She denies back/abdominal pain, flank pain, she does not have CVA tenderness or pain at her pubic area. She has no urinary symptoms of dysuria, or burning on urination. She has not had any gross hematuria. She denies N/V/D.     Again,  We discussed the types of organisms that are found in the urinary tract indicating that the vast majority are results of the patient's own gastrointestinal timothy.  We discussed how many of the antibiotics that are utilized can actually exacerbate these infections by creating resistant organisms and there is only a very few antibiotics that are concentrated in the urine and do not affect the rectal reservoir nor cause recurrent yeast vaginitis.       We discussed the risk factors for recurrent infections being intercourse in younger patients and atrophic changes in older patients.  We discussed the symptoms that are found including pain, pressure, burning, frequency, urgency suprapubic pain and painful intercourse.  I discussed upper tract symptoms including fevers, chills, and indicated the workup would be much more aggressive if the patient were to present with recurrent infections in the face of upper tract symptomatology such as fever. I discussed the history of vesicoureteral reflux in young patients and finally chronic renal scarring as a result of such.     Interstitial cystitis-we discussed the diagnosis and management of this condition.  I indicated that it was a multifactorial condition with multifactorial symptomatology, Including frequency, urgency, the sensation of urinary tract infection in the absence of a positive culture, sexual symptomatology including significant dyspareunia.  We discussed treatment options including the importance of making a clinical diagnosis and the cystoscopic findings including Hunner's ulcers etc.  We also discussed medical management including pharmacologic treatment such as amitriptyline, naturopathic treatments such as pumpkin oil and which more aggressive options of Botox injections as well as the relative risks and merits of this.  We also discussed the use of dietary manipulation including the over-the-counter product relief which basically decreases the acid in the urine and avoidance of ascitic-containing foods such as citrus is etc.            PLAN  We resent her urine for culture. I will call her with results if any bacteria growth.    Will START Macrobid 100 mg PO Daily -Suppressive Therapy.     She HAS BEEN ENCOURAGED TO  increase her p.o. fluid intake to at least 1 to 2 L daily and avoid bladder irritants such as caffeine products, spicy foods, and citrusy foods.     I recommend concomitant probiotics  with treatment with antibiotics to protect the rectal reservoir including over-the-counter yogurt preparations to jonas oral pills containing the appropriate probiotics. Patient reports the diligent use of Probiotics.    WE DISCUSSED STARTING MYRBETRIQ 25 MG daily for OAB-DEFERRED    DISCUSSED LOWER TRACT INVESTIGATION, PT HAS BEEN SCHEDULED FOR CYSTOSCOPY ON 11/07/23 WITH ANTHONY CALZADA-RECURRENT UTI VS CYSTITIS VS OAB/DI    WE DISCUSSED UPPER TRACK VIAS CT, DEFERRED, WILL GET KUB TODAY    WILL CALL PT WITH RESULTS AND DEFINITIVE PLAN OF CARE     ALSO GI REFERRAL FOR CHRONIC DIARRHEA X 3 YEARS    FOLLOW UP AS DISCUSSED, MAY RETURN SOONER IF NEED BE    Patient is agreeable to plan of care.    Patient reports that she is not currently experiencing any symptoms of urinary incontinence.    BMI is within normal parameters. No other follow-up for BMI required.    RADIOLOGY (CT AND/OR KUB):    CT Abdomen and Pelvis: No results found for this or any previous visit.     CT Stone Protocol: No results found for this or any previous visit.     KUB: No results found for this or any previous visit.       LABS (3 MONTHS):    Office Visit on 08/30/2023   Component Date Value Ref Range Status    Color 08/30/2023 Yellow  Yellow, Straw, Dark Yellow, Jade Final    Clarity, UA 08/30/2023 Clear  Clear Final    Specific Gravity  08/30/2023 1.010  1.005 - 1.030 Final    pH, Urine 08/30/2023 6.0  5.0 - 8.0 Final    Leukocytes 08/30/2023 Negative  Negative Final    Nitrite, UA 08/30/2023 Negative  Negative Final    Protein, POC 08/30/2023 Negative  Negative mg/dL Final    Glucose, UA 08/30/2023 Negative  Negative mg/dL Final    Ketones, UA 08/30/2023 Negative  Negative Final    Urobilinogen, UA 08/30/2023 Normal  Normal, 0.2 E.U./dL Final    Bilirubin 08/30/2023 Negative  Negative Final    Blood, UA 08/30/2023 Negative  Negative Final    Lot Number 08/30/2023 n   Final    Expiration Date 08/30/2023 n   Final        Smoking Cessation  "Counseling:  Never a smoker.  Patient does not currently use any tobacco products.     During this visit, I spent *** minutes counseling Madhavi regarding tobacco cessation.    {Time Spent (Optional):08077}    Follow Up {Instructions Charge Capture  Follow-up Communications :23}  No follow-ups on file.    Patient was given instructions and counseling regarding her condition or for health maintenance advice. Please see specific information pulled into the AVS if appropriate.          This document has been electronically signed by Griselda Cheng-Akwa, APRN   August 30, 2023 16:35 EDT      Dictated Utilizing Dragon Dictation: Part of this note may be an electronic transcription/translation of spoken language to printed text using the Dragon Dictation System.               Transcribed from ambient dictation for Griselda Cheng-Akwa, APRN by Alka Salvador.  08/30/23   17:24 EDT    {LATISHA Provider Statement:47467::\"Patient or patient representative verbalized consent to the visit recording.\",\"I have personally performed the services described in this document as transcribed by the above individual, and it is both accurate and complete.\"}       "

## 2023-08-31 ENCOUNTER — TELEPHONE (OUTPATIENT)
Dept: UROLOGY | Facility: CLINIC | Age: 48
End: 2023-08-31
Payer: COMMERCIAL

## 2023-08-31 DIAGNOSIS — B96.20 E-COLI UTI: ICD-10-CM

## 2023-08-31 DIAGNOSIS — N39.0 E-COLI UTI: ICD-10-CM

## 2023-08-31 DIAGNOSIS — N30.00 ACUTE CYSTITIS WITHOUT HEMATURIA: Primary | ICD-10-CM

## 2023-08-31 PROBLEM — R10.30 LOWER ABDOMINAL PAIN: Status: ACTIVE | Noted: 2023-08-31

## 2023-08-31 PROBLEM — R35.0 FREQUENCY OF URINATION: Status: ACTIVE | Noted: 2023-08-31

## 2023-08-31 PROBLEM — N32.81 DETRUSOR INSTABILITY OF BLADDER: Status: ACTIVE | Noted: 2023-08-31

## 2023-08-31 PROBLEM — B37.31 YEAST VAGINITIS: Status: ACTIVE | Noted: 2023-08-31

## 2023-08-31 RX ORDER — NITROFURANTOIN 25; 75 MG/1; MG/1
100 CAPSULE ORAL 2 TIMES DAILY
Qty: 20 CAPSULE | Refills: 0 | Status: SHIPPED | OUTPATIENT
Start: 2023-08-31

## 2023-08-31 RX ORDER — NITROFURANTOIN 25; 75 MG/1; MG/1
CAPSULE ORAL
Qty: 56 CAPSULE | Refills: 3 | Status: SHIPPED | OUTPATIENT
Start: 2023-08-31

## 2023-08-31 RX ORDER — FLUCONAZOLE 150 MG/1
150 TABLET ORAL ONCE
Qty: 2 TABLET | Refills: 0 | Status: SHIPPED | OUTPATIENT
Start: 2023-08-31 | End: 2023-08-31

## 2023-08-31 RX ORDER — ONDANSETRON 4 MG/1
4 TABLET, FILM COATED ORAL EVERY 12 HOURS PRN
Qty: 20 TABLET | Refills: 0 | Status: SHIPPED | OUTPATIENT
Start: 2023-08-31

## 2023-08-31 NOTE — TELEPHONE ENCOUNTER
Called patient to let her know that her urine culture showed E coli and [facility]. Patient was told to drop off another urine culture in 2 weeks per Delbert.

## 2023-08-31 NOTE — TELEPHONE ENCOUNTER
Called patient to check on her post clinic visit and to discuss KUB results which are unremarkable at this time.  Patient's voicemail at phone #[phone] without any voicemail set up.  We will try to call again later.    FINDINGS:  2 views of the abdomen.     No suspicious calcifications are seen in the imaged area.     No evidence of bowel obstruction.     IMPRESSION:  No suspicious calcifications identified on today's study.

## 2023-09-01 ENCOUNTER — TELEPHONE (OUTPATIENT)
Dept: UROLOGY | Facility: CLINIC | Age: 48
End: 2023-09-01
Payer: COMMERCIAL

## 2023-09-01 DIAGNOSIS — N39.0 RECURRENT UTI (URINARY TRACT INFECTION): ICD-10-CM

## 2023-09-01 DIAGNOSIS — A49.8 INFECTION DUE TO NON-O157 SHIGA TOXIN-PRODUCING ESCHERICHIA COLI (E.COLI): Primary | ICD-10-CM

## 2023-09-01 LAB — BACTERIA SPEC AEROBE CULT: ABNORMAL

## 2023-09-01 RX ORDER — LEVOFLOXACIN 750 MG/1
750 TABLET ORAL DAILY
Qty: 7 TABLET | Refills: 0 | Status: SHIPPED | OUTPATIENT
Start: 2023-09-01 | End: 2023-09-08

## 2023-09-01 NOTE — TELEPHONE ENCOUNTER
Did speak with patient this morning regarding her urine culture results and sensitivity.  Patient was started on Macrobid yesterday taking 100 mg p.o. twice daily, x10 days after which she will transition to Macrobid prophylaxis nightly.  However patient was adamant about Macrobid states she used it initially and it was ineffective.  Patient is requesting something stronger.    We discussed the side effects of other medications, and the importance of continuing Macrobid for prophylactic therapy since IT IS based on the bladder biome.  Her UTI symptoms have been ongoing for over 8 weeks and she is post numerous antibiotic therapy already.      Nevertheless per patient's request her UTI will be treated with Levaquin 750 mg p.o. daily for 7 days instead.  After that we will recheck her urinalysis and then hopefully continue suppressive therapy with Macrobid if indicated.    We also discussed lower tract investigation via cystoscopy, patient has been scheduled    She also follow-up with GI with concerns of chronic diarrhea    Patient has been encouraged to increase her p.o. fluid intake, continue probiotics as discussed.    Patient appreciative of call and will continue therapy as discussed.    Urine Culture 50,000 CFU/mL Escherichia coli Abnormal          Colonization of the urinary tract without infection is common. Treatment is discouraged unless the patient is symptomatic, pregnant, or undergoing an invasive urologic procedure.        Resulting Agency:  [facility]     Susceptibility     Escherichia coli     KATE     Ampicillin Resistant     Ampicillin + Sulbactam Resistant     Cefazolin Susceptible     Cefepime Susceptible     Ceftazidime Susceptible     Ceftriaxone Susceptible     Gentamicin Susceptible     Levofloxacin Susceptible     Nitrofurantoin Susceptible     Piperacillin + Tazobactam Susceptible     Trimethoprim + Sulfamethoxazole Susceptible

## 2023-09-01 NOTE — TELEPHONE ENCOUNTER
Patient stated she has already taken macrobid in the past and it did not help. She is wanting to know if this was a mistake or does she need to take it again.

## 2023-09-05 NOTE — PROGRESS NOTES
DATE OF CONSULTATION:  2023    REASON FOR REFERRAL: Lower abdominal pain     REFERRING PHYSICIAN:  Griselda Cheng-Akwa, AP*    CHIEF COMPLAINT:  Right lower quadrant abdominal pain, diarrhea     HISTORY OF PRESENT ILLNESS:   Madhavi Loving is a  48 y.o. female who is being seen today at the request of Griselda Cheng-Akwa, AP* for evaluation and treatment of lower abdominal pain. For the past ~1 year, she reports she has been struggling with intermittent RLQ abdominal pain and diarrhea. At present, she reports having ~2-3 bowel movements per day with stool type 5-6 on BSS. Occasionally, ~ once per month, she will have stool type 7 on BSS. She has associated abdominal bloating and does not feel as if she is evacuating her colon well.  She denies having BRBPR or melena. Denies unusual weight loss. Denies family history of colon cancer. She has never had a screening colonoscopy.  Of note, she is also following with urology for management of recurrent UTIs. She says she has been on several antibiotics over the past few months. She is currently taking Levaquin and is concerned about the possibility of C-diff. She had recent KUB on 23 which showed moderate stool along with gas throughout. She has not tried over the counter stool softeners, laxatives or fiber supplement. She has no other complaints today.       PAST MEDICAL HISTORY:  Past Medical History:   Diagnosis Date    Recurrent UTI        PAST SURGICAL HISTORY:  Past Surgical History:   Procedure Laterality Date     SECTION         FAMILY HISTORY:  Family History   Problem Relation Age of Onset    Hypertension Father     No Known Problems Mother        SOCIAL HISTORY:  Social History     Socioeconomic History    Marital status:    Tobacco Use    Smoking status: Never    Smokeless tobacco: Never   Vaping Use    Vaping Use: Never used   Substance and Sexual Activity    Alcohol use: Never    Drug use: Never    Sexual activity: Yes     Partners:  "Male         MEDICATIONS:  The current medication list was reviewed in the EMR    Current Outpatient Medications:     levoFLOXacin (Levaquin) 750 MG tablet, Take 1 tablet by mouth Daily for 7 days., Disp: 7 tablet, Rfl: 0    nitrofurantoin, macrocrystal-monohydrate, (Macrobid) 100 MG capsule, Take 1 capsule by mouth 2 (Two) Times a Day., Disp: 20 capsule, Rfl: 0    nitrofurantoin, macrocrystal-monohydrate, (Macrobid) 100 MG capsule, TAKE 1 CAPSULE BY MOUTH TWICE DAILY X 10 DAYS, AFTER, CONTINUETO TAKE 1 CAPSULE NIGHTLY FOR SUPPRESSIVE THERAPY E'COLI RECURRENT UTIs, Disp: 56 capsule, Rfl: 3    ondansetron (Zofran) 4 MG tablet, Take 1 tablet by mouth Every 12 (Twelve) Hours As Needed for Nausea., Disp: 20 tablet, Rfl: 0    ALLERGIES:  No Known Allergies    REVIEW OF SYSTEMS:    A comprehensive 14 point review of systems was performed.  Significant findings as mentioned above.  All other systems reviewed and are negative.        Physical Exam   Vital Signs: /79 (BP Location: Left arm, Patient Position: Sitting, Cuff Size: Small Adult)   Pulse 76   Ht 167.6 cm (66\")   Wt 67.1 kg (148 lb)   BMI 23.89 kg/m²    General: Well developed, well nourished, alert and oriented x 3, in no acute distress.   Head: ATNC   Eyes: PERRL, No evidence of conjunctivitis.   Nose: No nasal discharge.   Mouth: Oral mucosal membranes moist. No oral ulceration or hemorrhages.   Neck: Neck supple. No thyromegaly. No JVD.   Lungs: Clear in all fields to A&P without rales, rhonchi or wheezing.   Heart: RRR. No murmurs, rubs, or gallops.   Abdomen: Soft. Bowel sounds are normoactive. Nontender with palpation.   Extremities: No cyanosis or edema.   Neurologic: Grossly non-focal exam    ASSESSMENT & PLAN:  Madhavi Loving is a  48 y.o. female with    1.  Constipation:  2. Right lower quadrant abdominal pain:    -Will start fibercon 1 tablet twice daily. Also recommended Miralax 1 capful daily prn to help improve bowel movements. Given gas " and abdominal bloating, also recommended Low fodmap diet and handout was provided today.   -Per patient request, will check stool for C-diff given recent antibiotic use, although this is unlikely based on history above which I discussed with patient today.   -Will have her RTC in ~8 weeks for symptom check. Once we improve bowel habits, would recommended screening colonoscopy which we also discussed today.     The patient was in agreement with the plan and all questions were answered to her satisfaction.     Thank you so much for allowing us to participate in the care of Madhavi Loving . Please do not hesitate to contact us with any questions or concerns.             Electronically Signed by: ALLA Hernandez , September 5, 2023 10:28 EDT       CC:   Griselda Cheng-Akwa, AP*  Genie Yeung NP

## 2023-09-06 ENCOUNTER — OFFICE VISIT (OUTPATIENT)
Dept: GASTROENTEROLOGY | Facility: CLINIC | Age: 48
End: 2023-09-06
Payer: COMMERCIAL

## 2023-09-06 ENCOUNTER — LAB (OUTPATIENT)
Dept: UROLOGY | Facility: CLINIC | Age: 48
End: 2023-09-06
Payer: COMMERCIAL

## 2023-09-06 VITALS
HEIGHT: 66 IN | WEIGHT: 148 LBS | SYSTOLIC BLOOD PRESSURE: 119 MMHG | DIASTOLIC BLOOD PRESSURE: 79 MMHG | HEART RATE: 76 BPM | BODY MASS INDEX: 23.78 KG/M2

## 2023-09-06 DIAGNOSIS — N39.0 RECURRENT UTI (URINARY TRACT INFECTION): Primary | ICD-10-CM

## 2023-09-06 DIAGNOSIS — R19.7 DIARRHEA, UNSPECIFIED TYPE: Primary | ICD-10-CM

## 2023-09-06 DIAGNOSIS — K59.04 CHRONIC IDIOPATHIC CONSTIPATION: ICD-10-CM

## 2023-09-06 DIAGNOSIS — R10.31 RIGHT LOWER QUADRANT ABDOMINAL PAIN: ICD-10-CM

## 2023-09-06 PROCEDURE — 87086 URINE CULTURE/COLONY COUNT: CPT | Performed by: NURSE PRACTITIONER

## 2023-09-06 RX ORDER — CALCIUM POLYCARBOPHIL 625 MG
625 TABLET ORAL 2 TIMES DAILY
Qty: 60 TABLET | Refills: 3 | Status: SHIPPED | OUTPATIENT
Start: 2023-09-06

## 2023-09-07 ENCOUNTER — TELEPHONE (OUTPATIENT)
Dept: UROLOGY | Facility: CLINIC | Age: 48
End: 2023-09-07
Payer: COMMERCIAL

## 2023-09-07 LAB — BACTERIA SPEC AEROBE CULT: NO GROWTH

## 2023-09-07 NOTE — TELEPHONE ENCOUNTER
----- Message from Griselda Cheng-Akwa, APRN sent at 9/7/2023 11:28 AM EDT -----  PLEASE LET HER KNOW URINE CULTURE WAS NEGATIVE.CONTINUE SUPPRESSIVEE TAKING JUST ONE PILL AT HS OF MACROBID.  Urine Culture   No growth    SHE MAY DROP OFF ANOTHER SAMPLE IF SYMPTOMATIC, IF NOT, FOLLOW UP AS DISCUSSED FOR CYSTOSCOPY WITH DR. CRUZ.  THANK YOU

## 2023-09-12 DIAGNOSIS — A49.8 STAPHYLOCOCCUS EPIDERMIDIS INFECTION: ICD-10-CM

## 2023-09-12 DIAGNOSIS — A49.8 INFECTION DUE TO NON-O157 SHIGA TOXIN-PRODUCING ESCHERICHIA COLI (E.COLI): Primary | ICD-10-CM

## 2023-09-12 RX ORDER — LEVOFLOXACIN 750 MG/1
750 TABLET ORAL DAILY
Qty: 10 TABLET | Refills: 0 | Status: SHIPPED | OUTPATIENT
Start: 2023-09-12 | End: 2023-09-22

## 2023-09-12 RX ORDER — FLUCONAZOLE 150 MG/1
150 TABLET ORAL ONCE
Qty: 2 TABLET | Refills: 0 | Status: SHIPPED | OUTPATIENT
Start: 2023-09-12 | End: 2023-09-12

## 2023-09-22 ENCOUNTER — LAB (OUTPATIENT)
Dept: UROLOGY | Facility: CLINIC | Age: 48
End: 2023-09-22
Payer: COMMERCIAL

## 2023-09-28 ENCOUNTER — TELEPHONE (OUTPATIENT)
Dept: UROLOGY | Facility: CLINIC | Age: 48
End: 2023-09-28
Payer: COMMERCIAL

## 2023-09-28 DIAGNOSIS — N39.0 E-COLI UTI: Primary | ICD-10-CM

## 2023-09-28 DIAGNOSIS — B96.20 E-COLI UTI: Primary | ICD-10-CM

## 2023-09-28 RX ORDER — GENTAMICIN SULFATE 40 MG/ML
80 INJECTION, SOLUTION INTRAMUSCULAR; INTRAVENOUS EVERY 24 HOURS
Status: CANCELLED
Start: 2023-09-29

## 2023-09-28 NOTE — TELEPHONE ENCOUNTER
Routing to Romero to see if he would be willing to look at the patient Res[facility] that is scanned into her chart and advise me and I would be happy to call her back. She is Delbert's pt.

## 2023-09-28 NOTE — TELEPHONE ENCOUNTER
Called patient and discussed urine culture results.  Recommending IM injection of gentamicin daily for 5 days.  I will set this up to the outpatient infusion clinic.  Patient verbalized understanding.

## 2023-09-29 ENCOUNTER — LAB (OUTPATIENT)
Dept: UROLOGY | Facility: CLINIC | Age: 48
End: 2023-09-29
Payer: COMMERCIAL

## 2023-09-29 DIAGNOSIS — N39.0 E-COLI UTI: Primary | ICD-10-CM

## 2023-09-29 DIAGNOSIS — B96.20 E-COLI UTI: Primary | ICD-10-CM

## 2023-09-29 RX ORDER — GENTAMICIN SULFATE 40 MG/ML
80 INJECTION, SOLUTION INTRAMUSCULAR; INTRAVENOUS ONCE
Status: COMPLETED | OUTPATIENT
Start: 2023-09-29 | End: 2023-09-29

## 2023-09-29 RX ADMIN — GENTAMICIN SULFATE 80 MG: 40 INJECTION, SOLUTION INTRAMUSCULAR; INTRAVENOUS at 15:22

## 2023-09-30 ENCOUNTER — HOSPITAL ENCOUNTER (OUTPATIENT)
Dept: INFUSION THERAPY | Facility: HOSPITAL | Age: 48
Discharge: HOME | End: 2023-09-30
Payer: COMMERCIAL

## 2023-09-30 DIAGNOSIS — N39.0 RECURRENT UTI (URINARY TRACT INFECTION): Primary | ICD-10-CM

## 2023-09-30 PROCEDURE — 25010000002 GENTAMICIN PER 80 MG

## 2023-09-30 PROCEDURE — 96372 THER/PROPH/DIAG INJ SC/IM: CPT

## 2023-09-30 RX ORDER — GENTAMICIN SULFATE 40 MG/ML
80 INJECTION, SOLUTION INTRAMUSCULAR; INTRAVENOUS EVERY 24 HOURS
Status: CANCELLED
Start: 2023-09-30

## 2023-09-30 RX ORDER — GENTAMICIN SULFATE 40 MG/ML
80 INJECTION, SOLUTION INTRAMUSCULAR; INTRAVENOUS EVERY 24 HOURS
Status: DISCONTINUED | OUTPATIENT
Start: 2023-09-30 | End: 2023-10-02 | Stop reason: HOSPADM

## 2023-09-30 RX ADMIN — GENTAMICIN SULFATE 80 MG: 40 INJECTION, SOLUTION INTRAMUSCULAR; INTRAVENOUS at 08:30

## 2023-10-01 ENCOUNTER — HOSPITAL ENCOUNTER (OUTPATIENT)
Dept: INFUSION THERAPY | Facility: HOSPITAL | Age: 48
Discharge: HOME | End: 2023-10-01
Payer: COMMERCIAL

## 2023-10-01 DIAGNOSIS — N39.0 RECURRENT UTI (URINARY TRACT INFECTION): Primary | ICD-10-CM

## 2023-10-01 PROCEDURE — 25010000002 GENTAMICIN PER 80 MG

## 2023-10-01 PROCEDURE — 96372 THER/PROPH/DIAG INJ SC/IM: CPT

## 2023-10-01 RX ORDER — GENTAMICIN SULFATE 40 MG/ML
80 INJECTION, SOLUTION INTRAMUSCULAR; INTRAVENOUS EVERY 24 HOURS
Status: CANCELLED
Start: 2023-10-01

## 2023-10-01 RX ORDER — GENTAMICIN SULFATE 40 MG/ML
80 INJECTION, SOLUTION INTRAMUSCULAR; INTRAVENOUS EVERY 24 HOURS
Status: DISCONTINUED | OUTPATIENT
Start: 2023-10-01 | End: 2023-10-03 | Stop reason: HOSPADM

## 2023-10-01 RX ORDER — GENTAMICIN SULFATE 40 MG/ML
80 INJECTION, SOLUTION INTRAMUSCULAR; INTRAVENOUS ONCE
Start: 2023-10-02 | End: 2023-10-02

## 2023-10-01 RX ADMIN — GENTAMICIN SULFATE 80 MG: 40 INJECTION, SOLUTION INTRAMUSCULAR; INTRAVENOUS at 08:42

## 2023-10-02 ENCOUNTER — TELEPHONE (OUTPATIENT)
Dept: UROLOGY | Facility: CLINIC | Age: 48
End: 2023-10-02
Payer: COMMERCIAL

## 2023-10-02 ENCOUNTER — OFFICE VISIT (OUTPATIENT)
Dept: UROLOGY | Facility: CLINIC | Age: 48
End: 2023-10-02
Payer: COMMERCIAL

## 2023-10-02 VITALS
HEIGHT: 66 IN | WEIGHT: 148 LBS | HEART RATE: 73 BPM | BODY MASS INDEX: 23.78 KG/M2 | SYSTOLIC BLOOD PRESSURE: 120 MMHG | DIASTOLIC BLOOD PRESSURE: 85 MMHG

## 2023-10-02 DIAGNOSIS — A49.8 INFECTION DUE TO NON-O157 SHIGA TOXIN-PRODUCING ESCHERICHIA COLI (E.COLI): Primary | ICD-10-CM

## 2023-10-02 DIAGNOSIS — N32.81 DETRUSOR INSTABILITY OF BLADDER: ICD-10-CM

## 2023-10-02 DIAGNOSIS — N30.10 INTERSTITIAL CYSTITIS: ICD-10-CM

## 2023-10-02 DIAGNOSIS — N39.0 RECURRENT UTI (URINARY TRACT INFECTION): ICD-10-CM

## 2023-10-02 PROCEDURE — 99214 OFFICE O/P EST MOD 30 MIN: CPT | Performed by: NURSE PRACTITIONER

## 2023-10-02 RX ORDER — AMITRIPTYLINE HYDROCHLORIDE 10 MG/1
10 TABLET, FILM COATED ORAL NIGHTLY
Qty: 30 TABLET | Refills: 1 | Status: SHIPPED | OUTPATIENT
Start: 2023-10-02

## 2023-10-02 NOTE — PROGRESS NOTES
Chief Complaint  RECURRENT UTIs/ACUTE CYSTITIS/DETRUSOR INSTABILITY (6  WEEK FOLLOW UP)    Twila Loving presents to [facility] for RECURRENT UTI/OAB/DI  History of Present Illness    Ms. Madhavi Loving is a pleasant 48-year-old female who returns to clinic today for evaluation. This is a 6-week follow-up FOR UTI/OAB/DI. Initially evaluated in clinic on 08/30/2023, patient had presented with concerns of recurrent UTIs. She also reported overactive bladder/detrusor instability complicated by episodes of frequency, urgency, pelvic pressure and dysuria.     She also had urine frequency and urgency symptoms which were complicated also by bladder spasm, constant bloating, and lower abdominal pain. Patient did report pelvic pressure and suprapubic discomfort, however, denied flank pain, denied back pain, she did not have any CVA tenderness. Patient also denied any episodes of gross hematuria. She denied urinary incontinence.     On her initial evaluation, patient had been post multiple antibiotic therapy per her PCP for recurrent UTIs beginning with Macrobid, was transitioned to cefdinir and then Augmentin. Her urine culture  during her initial presentation was Klebsiella pneumoniae. We did repeat her urine culture post abx, and during her clinic  evaluation which came back positive for E. coli.   Urine Culture 08/30/23 50,000 CFU/mL Escherichia coli Abnormal         Due to her use of multiple antibiotics, we had discussed antibiotic suppressive therapy with Macrobid and also scheduling for lower tract investigation via cystoscopy. We completed a KUB which showed moderate stool volumes  and gas for which we had discussed a GI referral. Patient was barely on antibiotic suppressive therapy for a week and reported medications were ineffective. She was requesting something a lot stronger and was started on levofloxacin 500 mg twice a day for 10-day course.  Her urine recheck post therapy on 09/01/2023 was sent out for MDX testing per patient recommendation. She did not want urine cultures at the hospital.      The MDX results did show that,  there was E. coli, 30, thous colonies and also Staphylococcus epididymis for which patient was transitioned to levofloxacin. She did complete the 7-day therapy and dropped another repeat urine culture which was still positive for E. coli and Staphylococcus for which she completed another 10-day therapy. Patient's repeat urine culture on 09/26/2023 still showed coloniva E. coli growing and patient had requested for antibiotic therapy. We had discussed infectious disease referral. Still pending lower tract investigation. Due to her repeat MDX results, she did have a phone conversation with  TYSON Lora. Recommendations were for IM injection for Gentamicin daily for 5 days. So patient is currently on day 4 IM antibiotics with gentamicin. Nevertheless,  she presents to clinic today for re-evaluation due to her persistent uti symptoms.     Overall, The patient reports she is  not doing well. She is on her day 4th of 5th  therapy with Gentamicin 80 mg IM dosing. She is still not feeling well she states. She feels like she has a urinary tract infection. She has burning, urgency, frequency, and pressure. She has urgency every 2 hours. She has been drinking a lot of water. She drinks approximately 2 liters of water daily. She wakes up approximately 4 times at night to urinate. She has intermittent pressure with bladder pain. She rates her pain as a 5 out of 10. She has 1 to 2 bowel movements daily. She has occasional straining.    Active Ambulatory Problems     Diagnosis Date Noted    Recurrent UTI (urinary tract infection) 08/31/2023    Acute cystitis without hematuria 08/31/2023    Detrusor instability of bladder 08/31/2023    Frequency of urination 08/31/2023    Lower abdominal pain 08/31/2023    Yeast vaginitis 08/31/2023  "    Resolved Ambulatory Problems     Diagnosis Date Noted    No Resolved Ambulatory Problems     Past Medical History:   Diagnosis Date    Recurrent UTI       Objective   Vital Signs:   /85 (BP Location: Right arm, Patient Position: Sitting)   Pulse 73   Ht 167.6 cm (65.98\")   Wt 67.1 kg (148 lb)   BMI 23.90 kg/m²       ROS:   Review of Systems   Constitutional:  Positive for activity change and fatigue. Negative for appetite change, chills, diaphoresis, fever, unexpected weight gain and unexpected weight loss.   HENT: Negative.  Negative for congestion, ear discharge, ear pain, nosebleeds, rhinorrhea, sinus pressure and sore throat.    Eyes: Negative.  Negative for blurred vision, double vision, photophobia, pain, redness and visual disturbance.   Respiratory:  Negative for apnea, cough, chest tightness, shortness of breath, wheezing and stridor.    Cardiovascular:  Negative for chest pain, palpitations and leg swelling.   Gastrointestinal:  Positive for abdominal pain, constipation, nausea and GERD. Negative for abdominal distention, diarrhea and vomiting.   Endocrine: Negative.  Negative for polydipsia, polyphagia and polyuria.   Genitourinary:  Positive for difficulty urinating, frequency, pelvic pain, pelvic pressure and urgency. Negative for decreased urine volume, dyspareunia, dysuria, flank pain, genital sores, hematuria, urinary incontinence and vaginal discharge.   Musculoskeletal:  Positive for back pain. Negative for arthralgias and joint swelling.   Skin:  Positive for color change, dry skin and pallor. Negative for rash and wound.   Allergic/Immunologic: Negative.    Neurological:  Negative for dizziness, tremors, syncope, weakness, light-headedness, headache, memory problem and confusion.   Hematological: Negative.    Psychiatric/Behavioral: Negative.  Negative for behavioral problems and decreased concentration.       Physical Exam  Constitutional:       General: She is in acute distress. "      Appearance: She is ill-appearing.   Abdominal:      General: There is distension.      Tenderness: There is abdominal tenderness. There is guarding.   Genitourinary:     Comments: Urine frequency, urgency, pelvic pressure  Skin:     General: Skin is warm and dry.      Capillary Refill: Capillary refill takes less than 2 seconds.      Coloration: Skin is pale.   Neurological:      General: No focal deficit present.   Psychiatric:         Thought Content: Thought content normal.      Result Review :     UA          8/30/2023    15:35   Urinalysis   Ketones, UA Negative    Leukocytes, UA Negative      Urine Culture          8/30/2023    15:34 9/6/2023    10:29   Urine Culture   Urine Culture 50,000 CFU/mL Escherichia coli  No growth             Assessment and Plan    Problem List Items Addressed This Visit          Genitourinary and Reproductive     Recurrent UTI (urinary tract infection)    Relevant Orders    Ambulatory Referral to Infectious Disease    Detrusor instability of bladder    Relevant Medications    amitriptyline (ELAVIL) 10 MG tablet     Other Visit Diagnoses       Infection due to non-O157 Shiga toxin-producing Escherichia coli (E.coli)    -  Primary    Relevant Orders    Ambulatory Referral to Infectious Disease    Interstitial cystitis        Relevant Medications    amitriptyline (ELAVIL) 10 MG tablet                                                                 ASSESSMENT           Recurrent urinary tract infections/OverActiveBladder/DI      MS BERTHA GOMEZ IS A 48 YEAR OLD Very pleasant and energetic female evaluated in clinic today for  Recurrent UTI's , OAB/DI SYMPTOMS THAT HAVE BEEN ONGOING, and recently becoming very bothersome to her. She is in apparent distress upon exam in clinic today and reports minimal  improvement in her overall symptoms post 3 different abx(Macrobid, cefdinir, Augmentin), for acute cystitis without hematuria/Klebsiella pneumonia and group B strep.       However,  She reports doing relatively better post her last AB X therapy.  However she still has frequency, urgency, pelvic pressure, suprapubic discomfort and dysuria which has remained very bothersome to her. Although  She is happy to be feeling better she states, she has bouts of  pelvic pressure, bloating, and abdominal pain. She has occassional nausea, denies vomiting, denies chills or fevers. She DENIES having any episodes of perineal irritation and yeast. She denies back/Flank pain, she does not have CVA tenderness . She has not had any gross hematuria.  Her urinalysis today is completely negative for any leukocyte esterase, it is negative for nitrites, it is negative for gross/microscopic hematuria.  Her PVR is 0 mL.     Again,  We discussed the types of organisms that are found in the urinary tract indicating that the vast majority are results of the patient's own gastrointestinal timothy.  We discussed how many of the antibiotics that are utilized can actually exacerbate these infections by creating resistant organisms and there is only a very few antibiotics that are concentrated in the urine and do not affect the rectal reservoir nor cause recurrent yeast vaginitis.       We discussed the risk factors for recurrent infections being intercourse in younger patients and atrophic changes in older patients.  We discussed the symptoms that are found including pain, pressure, burning, frequency, urgency suprapubic pain and painful intercourse.  I discussed upper tract symptoms including fevers, chills, and indicated the workup would be much more aggressive if the patient were to present with recurrent infections in the face of upper tract symptomatology such as fever. I discussed the history of vesicoureteral reflux in young patients and finally chronic renal scarring as a result of such.      Interstitial cystitis-we discussed the diagnosis and management of this condition.  I indicated that it was a multifactorial  impaired condition with multifactorial symptomatology, Including frequency, urgency, the sensation of urinary tract infection in the absence of a positive culture, sexual symptomatology including significant dyspareunia.  We discussed treatment options including the importance of making a clinical diagnosis and the cystoscopic findings including Hunner's ulcers etc.  We also discussed medical management including pharmacologic treatment such as amitriptyline, naturopathic treatments such as pumpkin oil and which more aggressive options of Botox injections as well as the relative risks and merits of this.  We also discussed the use of dietary manipulation including the over-the-counter product relief which basically decreases the acid in the urine and avoidance of ascitic-containing foods such as citrus is etc.     ALSO,  we discussed OAB/Detrusor Instability which is an  irritative bladder symptomatology most likely related to factors such as intake of bladder irritants, postinfectious irritation, prolapse, with a very large differential diagnosis.  The mainstay of treatment has been tight cholinergics which basically caused the bladder to have decreased contractility.  We have discussed the side effects of these treatments including dry mouth, double vision, and increasing constipation.                                                       PLAN  Continue Gentamicin 80 mg IM x 1 dose today(day 4 of 5     We discussed rechecking her urine dipstick in another 2 weeks     She HAS BEEN ENCOURAGED TO  increase her p.o. fluid intake to at least 1 to 2 L daily and avoid bladder irritants such as caffeine products, spicy foods, and citrusy foods.      I recommend concomitant probiotics with treatment with antibiotics to protect the rectal reservoir including over-the-counter yogurt preparations to jonas oral pills containing the appropriate probiotics. Patient reports the diligent use of Probiotics.     WE DISCUSSED STARTING  MYRBETRIQ 25 MG daily for OAB/DI-DEFERRED     DISCUSSED LOWER TRACT INVESTIGATION, PT HAS BEEN SCHEDULED FOR CYSTOSCOPY ON 11/07/23 WITH ANTHONY CALZADA-RECURRENT UTI VS CYSTITIS VS OAB/DI     WE DISCUSSED UPPER TRACK VIA CT, DEFERRED,    Referral placed to ID -Recurrent UTIS    WILL CALL PT WITH RESULTS AND DEFINITIVE PLAN OF CARE      ALSO GI REFERRAL FOR CHRONIC DIARRHEA X 3 YEARS     FOLLOW UP AS DISCUSSED, MAY RETURN SOONER IF NEED BE     Patient is agreeable to plan of care.     Patient reports that she is not currently experiencing any symptoms of urinary incontinence.    BMI is within normal parameters. No other follow-up for BMI required.    RADIOLOGY (CT AND/OR KUB):    CT Abdomen and Pelvis: No results found for this or any previous visit.     CT Stone Protocol: No results found for this or any previous visit.     KUB: Results for orders placed in visit on 08/30/23    XR abdomen kub    Narrative  EXAMINATION: XR ABDOMEN KUB-    CLINICAL INDICATION: abdominal pain; R35.0-Frequency of micturition;  N30.00-Acute cystitis without hematuria; N32.81-Overactive bladder;  N32.81-Overactive bladder; R10.30-Lower abdominal pain, unspecified      COMPARISON: None available.    FINDINGS:  2 views of the abdomen.    No suspicious calcifications are seen in the imaged area.    No evidence of bowel obstruction.    Impression  No suspicious calcifications identified on today's study.    This report was finalized on 8/31/2023 9:59 AM by Dr. Richy Jernigan MD.       LABS (3 MONTHS):    Lab on 09/06/2023   Component Date Value Ref Range Status    Urine Culture 09/06/2023 No growth   Final   Office Visit on 08/30/2023   Component Date Value Ref Range Status    Color 08/30/2023 Yellow  Yellow, Straw, Dark Yellow, Jade Final    Clarity, UA 08/30/2023 Clear  Clear Final    Specific Gravity  08/30/2023 1.010  1.005 - 1.030 Final    pH, Urine 08/30/2023 6.0  5.0 - 8.0 Final    Leukocytes 08/30/2023 Negative  Negative Final     Nitrite, UA 08/30/2023 Negative  Negative Final    Protein, POC 08/30/2023 Negative  Negative mg/dL Final    Glucose, UA 08/30/2023 Negative  Negative mg/dL Final    Ketones, UA 08/30/2023 Negative  Negative Final    Urobilinogen, UA 08/30/2023 Normal  Normal, 0.2 E.U./dL Final    Bilirubin 08/30/2023 Negative  Negative Final    Blood, UA 08/30/2023 Negative  Negative Final    Lot Number 08/30/2023 n   Final    Expiration Date 08/30/2023 n   Final    Urine Culture 08/30/2023 50,000 CFU/mL Escherichia coli (A)   Final          Smoking Cessation Counseling:  Never a smoker.  Patient does not currently use any tobacco products.     Follow Up   Return in about 5 weeks (around 11/7/2023) for Next scheduled follow up, With Dr. Curtis, Cystoscopy, RECURRENT UTI/DYSURIA/DETRUSSOR INSTABILITY.    Patient was given instructions and counseling regarding her condition or for health maintenance advice. Please see specific information pulled into the AVS if appropriate.          This document has been electronically signed by Griselda Cheng-Akwa, APRN   October 3, 2023 23:14 EDT      Dictated Utilizing Dragon Dictation: Part of this note may be an electronic transcription/translation of spoken language to printed text using the Dragon Dictation System.    Transcribed from ambient dictation for Griselda Cheng-Akwa, APRN by Alka Salvador.  10/02/23   17:12 EDT    Patient or patient representative verbalized consent to the visit recording.  I have personally performed the services described in this document as transcribed by the above individual, and it is both accurate and complete.

## 2023-10-02 NOTE — TELEPHONE ENCOUNTER
CALLED AND SCHEDULED APPT WITH INFECTIOUS DISEASE WHILE PATIENT WAS IS OFFICE FOR 10/10/23 AT 11:15 PATIENT IS AWARE AND VOICED UNDERSTANDING PER LONNY

## 2023-10-10 ENCOUNTER — OFFICE VISIT (OUTPATIENT)
Dept: INFECTIOUS DISEASES | Facility: CLINIC | Age: 48
End: 2023-10-10
Payer: COMMERCIAL

## 2023-10-10 VITALS
SYSTOLIC BLOOD PRESSURE: 130 MMHG | HEIGHT: 66 IN | WEIGHT: 145.8 LBS | HEART RATE: 72 BPM | TEMPERATURE: 98.3 F | BODY MASS INDEX: 23.43 KG/M2 | DIASTOLIC BLOOD PRESSURE: 77 MMHG | OXYGEN SATURATION: 99 %

## 2023-10-10 DIAGNOSIS — R35.0 FREQUENCY OF URINATION: ICD-10-CM

## 2023-10-10 DIAGNOSIS — N39.0 RECURRENT UTI (URINARY TRACT INFECTION): Primary | ICD-10-CM

## 2023-10-10 LAB
BACTERIA UR QL AUTO: NORMAL /HPF
BILIRUB UR QL STRIP: NEGATIVE
CLARITY UR: CLEAR
COLOR UR: YELLOW
GLUCOSE UR STRIP-MCNC: NEGATIVE MG/DL
HGB UR QL STRIP.AUTO: NEGATIVE
HYALINE CASTS UR QL AUTO: NORMAL /LPF
KETONES UR QL STRIP: ABNORMAL
LEUKOCYTE ESTERASE UR QL STRIP.AUTO: NEGATIVE
NITRITE UR QL STRIP: NEGATIVE
PH UR STRIP.AUTO: 6 [PH] (ref 5–8)
PROT UR QL STRIP: NEGATIVE
RBC # UR STRIP: NORMAL /HPF
REF LAB TEST METHOD: NORMAL
SP GR UR STRIP: 1.01 (ref 1–1.03)
SQUAMOUS #/AREA URNS HPF: NORMAL /HPF
UROBILINOGEN UR QL STRIP: ABNORMAL
WBC # UR STRIP: NORMAL /HPF

## 2023-10-10 PROCEDURE — 87086 URINE CULTURE/COLONY COUNT: CPT | Performed by: INTERNAL MEDICINE

## 2023-10-10 PROCEDURE — 81001 URINALYSIS AUTO W/SCOPE: CPT | Performed by: INTERNAL MEDICINE

## 2023-10-10 PROCEDURE — 87147 CULTURE TYPE IMMUNOLOGIC: CPT | Performed by: INTERNAL MEDICINE

## 2023-10-10 NOTE — PROGRESS NOTES
[facility]         Referring Provider: Genie Yeung NP  [address] [place] [address]    Subjective      Chief Complaint  Initial Evaluation (Ecoli, uti)    History of Present Illness  Madhavi Loving is a 48 y.o. female who presents today to [facility] for Initial Consultation  . Past medical history is significant for recurrent UTIs with recent E. Coli and another urine culture sent to a reference lab that did not perform susceptibilities for some reason (SNP = Susceptibility Not Performed). No fever, no chills, no night sweats. No urinary symptoms today.    Past Medical History:   Diagnosis Date    Recurrent UTI        Past Surgical History:   Procedure Laterality Date     SECTION         Social History     Socioeconomic History    Marital status:    Tobacco Use    Smoking status: Never    Smokeless tobacco: Never   Vaping Use    Vaping Use: Never used   Substance and Sexual Activity    Alcohol use: Never    Drug use: Never    Sexual activity: Yes     Partners: Male       Family History  family history includes Hypertension in her father; No Known Problems in her mother.      There is no immunization history on file for this patient.     Allergies  No Known Allergies    The medication list has been reviewed and updated.   Current Medications    Current Outpatient Medications:     amitriptyline (ELAVIL) 10 MG tablet, Take 1 tablet by mouth Every Night. (Patient not taking: Reported on 10/10/2023), Disp: 30 tablet, Rfl: 1    calcium polycarbophil (FiberCon) 625 MG tablet, Take 1 tablet by mouth 2 (Two) Times a Day. (Patient not taking: Reported on 10/10/2023), Disp: 60 tablet, Rfl: 3    nitrofurantoin, macrocrystal-monohydrate, (Macrobid) 100 MG capsule, Take 1 capsule by mouth 2 (Two) Times a Day. (Patient not taking: Reported on 10/10/2023), Disp: 20 capsule, Rfl: 0    nitrofurantoin, macrocrystal-monohydrate, (Macrobid)  "100 MG capsule, TAKE 1 CAPSULE BY MOUTH TWICE DAILY X 10 DAYS, AFTER, CONTINUETO TAKE 1 CAPSULE NIGHTLY FOR SUPPRESSIVE THERAPY E'COLI RECURRENT UTIs (Patient not taking: Reported on 10/10/2023), Disp: 56 capsule, Rfl: 3    ondansetron (Zofran) 4 MG tablet, Take 1 tablet by mouth Every 12 (Twelve) Hours As Needed for Nausea. (Patient not taking: Reported on 10/10/2023), Disp: 20 tablet, Rfl: 0      Review of Systems    Review of Systems   Constitutional:  Negative for activity change, appetite change, chills, diaphoresis, fatigue and fever.   HENT:  Negative for congestion, dental problem, drooling, ear discharge, ear pain, facial swelling, postnasal drip, sinus pressure, sore throat and swollen glands.    Eyes:  Negative for blurred vision, double vision, pain, discharge and itching.   Respiratory:  Negative for apnea, cough, choking, chest tightness and shortness of breath.    Cardiovascular:  Negative for chest pain, palpitations and leg swelling.   Gastrointestinal:  Negative for abdominal distention, abdominal pain, diarrhea, nausea, rectal pain and vomiting.   Genitourinary:  Positive for frequency. Negative for difficulty urinating, dysuria, flank pain, hematuria, pelvic pain and pelvic pressure.   Neurological:  Negative for dizziness, tremors, seizures, syncope, speech difficulty and confusion.   Psychiatric/Behavioral:  Negative for agitation and behavioral problems.         Objective     Vital Signs:  /77 (BP Location: Right arm, Patient Position: Sitting, Cuff Size: Small Adult)   Pulse 72   Temp 98.3 øF (36.8 øC) (Temporal)   Ht 167.6 cm (66\")   Wt 66.1 kg (145 lb 12.8 oz)   SpO2 99%   BMI 23.53 kg/mý   Estimated body mass index is 23.53 kg/mý as calculated from the following:    Height as of this encounter: 167.6 cm (66\").    Weight as of this encounter: 66.1 kg (145 lb 12.8 oz).    Physical Exam  Constitutional:       General: She is not in acute distress.     Appearance: Normal " "appearance. She is not ill-appearing, toxic-appearing or diaphoretic.   HENT:      Head: Normocephalic and atraumatic.      Nose: No congestion or rhinorrhea.      Mouth/Throat:      Pharynx: Oropharynx is clear. No oropharyngeal exudate or posterior oropharyngeal erythema.   Cardiovascular:      Rate and Rhythm: Normal rate and regular rhythm.      Heart sounds: No murmur heard.  Pulmonary:      Effort: No respiratory distress.      Breath sounds: No stridor. No wheezing, rhonchi or rales.   Chest:      Chest wall: No tenderness.   Abdominal:      General: There is no distension.      Tenderness: There is no abdominal tenderness. There is no right CVA tenderness, left CVA tenderness, guarding or rebound.   Musculoskeletal:         General: No swelling, tenderness or signs of injury.      Cervical back: No rigidity or tenderness.   Skin:     Coloration: Skin is not jaundiced or pale.      Findings: No bruising, erythema or rash.   Neurological:      General: No focal deficit present.      Mental Status: She is alert. Mental status is at baseline.   Psychiatric:         Mood and Affect: Mood normal.         Behavior: Behavior normal.          Result Review :  The following data was reviewed by Fidel Villegas MD     Lab Results  No results found for: \"WBC\", \"HGB\", \"HCT\", \"MCV\", \"PLT\"  No results found for: \"GLUCOSE\", \"BUN\", \"CREATININE\", \"EGFRIFNONA\", \"EGFRIFAFRI\", \"BCR\", \"K\", \"CO2\", \"CALCIUM\", \"PROTENTOTREF\", \"ALBUMIN\", \"LABIL2\", \"BILIRUBIN\", \"AST\", \"ALT\"   No results found for: \"CRP\"     No results found for: \"ACANTHNAEG\", \"AFBCX\", \"BPERTUSSISCX\", \"BLOODCX\"  No results found for: \"BCIDPCR\", \"CXREFLEX\", \"CSFCX\", \"CULTURETIS\"  No results found for: \"CULTURES\", \"HSVCX\", \"URCX\"  No results found for: \"EYECULTURE\", \"GCCX\", \"HSVCULTURE\", \"LABHSV\"  No results found for: \"LEGIONELLA\", \"MRSACX\", \"MUMPSCX\", \"MYCOPLASCX\"  No results found for: \"NOCARDIACX\", \"STOOLCX\"  No results found for: \"THROATCX\", \"UNSTIMCULT\", " "\"URINECX\", \"CULTURE\", \"VZVCULTUR\"  No results found for: \"VIRALCULTU\", \"WOUNDCX\"    Radiology Results              Assessment / Plan        Diagnoses and all orders for this visit:    1. Recurrent UTI (urinary tract infection) (Primary)  -     Urinalysis With Microscopic - Urine, Clean Catch; Future  -     Urine Culture - Urine, Urine, Clean Catch; Future    2. Frequency of urination  -     Urinalysis With Microscopic - Urine, Clean Catch; Future  -     Urine Culture - Urine, Urine, Clean Catch; Future      Will go ahead and repeat UA and urine culture today and follow up on susceptibilities.     Patient is off Macrobid suppressive therapy for now.    Counseled regarding fluid intake and dietary restrictions.          Follow Up   Return in about 1 week (around 10/17/2023) for Follow up, With Dr. Villegas.    Visit Diagnoses:    ICD-10-CM ICD-9-CM   1. Recurrent UTI (urinary tract infection)  N39.0 599.0   2. Frequency of urination  R35.0 788.41       Patient was given instructions and counseling regarding her condition or for health maintenance advice. Please see specific information pulled into the AVS if appropriate.     This document has been electronically signed by Fidel Villegas MD   October 10, 2023 11:51 EDT    Dictated Utilizing Dragon Dictation: Part of this note may be an electronic transcription/translation of spoken language to printed text using the Dragon Dictation System.    "

## 2023-10-11 LAB — BACTERIA SPEC AEROBE CULT: ABNORMAL

## 2023-10-18 ENCOUNTER — OFFICE VISIT (OUTPATIENT)
Dept: GASTROENTEROLOGY | Facility: CLINIC | Age: 48
End: 2023-10-18
Payer: COMMERCIAL

## 2023-10-18 VITALS
HEART RATE: 66 BPM | WEIGHT: 142 LBS | SYSTOLIC BLOOD PRESSURE: 113 MMHG | DIASTOLIC BLOOD PRESSURE: 71 MMHG | HEIGHT: 66 IN | BODY MASS INDEX: 22.82 KG/M2

## 2023-10-18 DIAGNOSIS — K59.04 CHRONIC IDIOPATHIC CONSTIPATION: Primary | ICD-10-CM

## 2023-10-18 PROCEDURE — 99213 OFFICE O/P EST LOW 20 MIN: CPT | Performed by: NURSE PRACTITIONER

## 2023-10-18 NOTE — PROGRESS NOTES
DATE:  10/18/2023    REASON FOR FOLLOW UP: Constipation    REFERRING PHYSICIAN:   Griselda Cheng-Akwa, AP*     CHIEF COMPLAINT:  Follow up of constipation    HISTORY OF PRESENT ILLNESS:   Madhavi Loving is a  48 y.o. female who is being seen today at the request of Griselda Cheng-Akwa, AP* for evaluation and treatment of lower abdominal pain. For the past ~1 year, she reports she has been struggling with intermittent RLQ abdominal pain and diarrhea. At present, she reports having ~2-3 bowel movements per day with stool type 5-6 on BSS. Occasionally, ~ once per month, she will have stool type 7 on BSS. She has associated abdominal bloating and does not feel as if she is evacuating her colon well.  She denies having BRBPR or melena. Denies unusual weight loss. Denies family history of colon cancer. She has never had a screening colonoscopy.  Of note, she is also following with urology for management of recurrent UTIs. She says she has been on several antibiotics over the past few months. She is currently taking Levaquin and is concerned about the possibility of C-diff. She had recent KUB on 8/30/23 which showed moderate stool along with gas throughout. She has not tried over the counter stool softeners, laxatives or fiber supplement. She has no other complaints today.     INTERVAL HISTORY:  Ms. Loving presents today for follow up. At her last visit, she was started on Fibercon twice daily and advised to take Miralax as needed. She says she stopped taking fibercon due to fear for reaction for Levaquin she was previously taking (now discontinued). She reports she did not try taking Miralax but has been taking a probiotic. At present, she reports she is now doing well. Her bowels are moving 1-2 times per day with stool type 4 on BSS. She reports improvement in abdominal bloating and lower abdominal pain has resolved. She has not had a screening colonoscopy but is hesitant to have this done. She denies family history  "of colon cancer. She has no other complaints today.     PAST MEDICAL HISTORY:  Past Medical History:   Diagnosis Date    Recurrent UTI        PAST SURGICAL HISTORY:  Past Surgical History:   Procedure Laterality Date     SECTION         FAMILY HISTORY:  Family History   Problem Relation Age of Onset    Hypertension Father     No Known Problems Mother        SOCIAL HISTORY:  Social History     Socioeconomic History    Marital status:    Tobacco Use    Smoking status: Never    Smokeless tobacco: Never   Vaping Use    Vaping Use: Never used   Substance and Sexual Activity    Alcohol use: Never    Drug use: Never    Sexual activity: Yes     Partners: Male         MEDICATIONS:  The current medication list was reviewed in the EMR    Current Outpatient Medications:     amitriptyline (ELAVIL) 10 MG tablet, Take 1 tablet by mouth Every Night., Disp: 30 tablet, Rfl: 1    calcium polycarbophil (FiberCon) 625 MG tablet, Take 1 tablet by mouth 2 (Two) Times a Day., Disp: 60 tablet, Rfl: 3    nitrofurantoin, macrocrystal-monohydrate, (Macrobid) 100 MG capsule, Take 1 capsule by mouth 2 (Two) Times a Day., Disp: 20 capsule, Rfl: 0    nitrofurantoin, macrocrystal-monohydrate, (Macrobid) 100 MG capsule, TAKE 1 CAPSULE BY MOUTH TWICE DAILY X 10 DAYS, AFTER, CONTINUETO TAKE 1 CAPSULE NIGHTLY FOR SUPPRESSIVE THERAPY E'COLI RECURRENT UTIs, Disp: 56 capsule, Rfl: 3    ondansetron (Zofran) 4 MG tablet, Take 1 tablet by mouth Every 12 (Twelve) Hours As Needed for Nausea., Disp: 20 tablet, Rfl: 0    ALLERGIES:  No Known Allergies    REVIEW OF SYSTEMS:    A comprehensive 14 point review of systems was performed.  Significant findings as mentioned above.  All other systems reviewed and are negative.        Physical Exam   Vital Signs: /71 (BP Location: Left arm, Patient Position: Sitting, Cuff Size: Small Adult)   Pulse 66   Ht 167.6 cm (66\")   Wt 64.4 kg (142 lb)   BMI 22.92 kg/m²    General: Well developed, well " nourished, alert and oriented x 3, in no acute distress.   Head: ATNC   Eyes: PERRL, No evidence of conjunctivitis.   Nose: No nasal discharge.   Mouth: Oral mucosal membranes moist. No oral ulceration or hemorrhages.   Neck: Neck supple. No thyromegaly. No JVD.   Lungs: Clear in all fields to A&P without rales, rhonchi or wheezing.   Heart: RRR. No murmurs, rubs, or gallops.   Abdomen: Soft. Bowel sounds are normoactive. Nontender with palpation.   Extremities: No cyanosis or edema.   Neurologic: Grossly non-focal exam    ASSESSMENT & PLAN:  Madhavi Loving is a  48 y.o. female with    1.  Constipation:  -Currently resolved with probiotic. At present, no further follow up needed. If constipation should occur again, would recommended Miralax 1 capful daily prn.   -She has not had a screening colonoscopy. This was recommended but she is hesitant to have this done at this time.   -RTC if needed.     The patient was in agreement with the plan and all questions were answered to her satisfaction.     Thank you so much for allowing us to participate in the care of Madhavi Loving . Please do not hesitate to contact us with any questions or concerns.             Electronically Signed by: ALLA Hernandez , October 18, 2023 15:40 EDT       CC:   Genie Yeung NP

## 2023-10-25 ENCOUNTER — LAB (OUTPATIENT)
Dept: UROLOGY | Facility: CLINIC | Age: 48
End: 2023-10-25
Payer: COMMERCIAL

## 2023-11-07 ENCOUNTER — HOSPITAL ENCOUNTER (OUTPATIENT)
Dept: CT IMAGING | Facility: HOSPITAL | Age: 48
Discharge: HOME | End: 2023-11-07
Admitting: UROLOGY
Payer: COMMERCIAL

## 2023-11-07 ENCOUNTER — PROCEDURE VISIT (OUTPATIENT)
Dept: UROLOGY | Facility: CLINIC | Age: 48
End: 2023-11-07
Payer: COMMERCIAL

## 2023-11-07 VITALS
HEART RATE: 86 BPM | WEIGHT: 141.2 LBS | SYSTOLIC BLOOD PRESSURE: 128 MMHG | BODY MASS INDEX: 22.69 KG/M2 | DIASTOLIC BLOOD PRESSURE: 76 MMHG | HEIGHT: 66 IN

## 2023-11-07 DIAGNOSIS — A49.8 INFECTION DUE TO NON-O157 SHIGA TOXIN-PRODUCING ESCHERICHIA COLI (E.COLI): Primary | ICD-10-CM

## 2023-11-07 DIAGNOSIS — A49.8 INFECTION DUE TO NON-O157 SHIGA TOXIN-PRODUCING ESCHERICHIA COLI (E.COLI): ICD-10-CM

## 2023-11-07 DIAGNOSIS — N39.0 RECURRENT UTI (URINARY TRACT INFECTION): ICD-10-CM

## 2023-11-07 PROCEDURE — 74176 CT ABD & PELVIS W/O CONTRAST: CPT

## 2023-11-07 PROCEDURE — 74176 CT ABD & PELVIS W/O CONTRAST: CPT | Performed by: RADIOLOGY

## 2023-11-07 RX ORDER — GENTAMICIN SULFATE 40 MG/ML
80 INJECTION, SOLUTION INTRAMUSCULAR; INTRAVENOUS ONCE
Status: COMPLETED | OUTPATIENT
Start: 2023-11-07 | End: 2023-11-07

## 2023-11-07 RX ORDER — PHENAZOPYRIDINE HYDROCHLORIDE 200 MG/1
200 TABLET, FILM COATED ORAL 3 TIMES DAILY PRN
Qty: 60 TABLET | Refills: 3 | Status: SHIPPED | OUTPATIENT
Start: 2023-11-07

## 2023-11-07 RX ADMIN — GENTAMICIN SULFATE 80 MG: 40 INJECTION, SOLUTION INTRAMUSCULAR; INTRAVENOUS at 09:43

## 2023-11-07 NOTE — PROGRESS NOTES
Chief Complaint:      Chief Complaint   Patient presents with    Recurrent Uti's     Cystoscopy        HPI:   48 y.o. female here recurrent infections.  Here for cystoscopy she has never had a formal upper tract study I obtained a CT today which essentially negative for stones with obstruction.  Her cystoscopy was unremarkable we discussed bacterial prevention prophylaxis etc. I recommended probiotics etc. I will see her back in 1 month    Past Medical History:     Past Medical History:   Diagnosis Date    Recurrent UTI        Current Meds:     Current Outpatient Medications   Medication Sig Dispense Refill    amitriptyline (ELAVIL) 10 MG tablet Take 1 tablet by mouth Every Night. 30 tablet 1    calcium polycarbophil (FiberCon) 625 MG tablet Take 1 tablet by mouth 2 (Two) Times a Day. 60 tablet 3    nitrofurantoin, macrocrystal-monohydrate, (Macrobid) 100 MG capsule Take 1 capsule by mouth 2 (Two) Times a Day. 20 capsule 0    nitrofurantoin, macrocrystal-monohydrate, (Macrobid) 100 MG capsule TAKE 1 CAPSULE BY MOUTH TWICE DAILY X 10 DAYS, AFTER, CONTINUETO TAKE 1 CAPSULE NIGHTLY FOR SUPPRESSIVE THERAPY E'COLI RECURRENT UTIs 56 capsule 3    ondansetron (Zofran) 4 MG tablet Take 1 tablet by mouth Every 12 (Twelve) Hours As Needed for Nausea. 20 tablet 0     No current facility-administered medications for this visit.        Allergies:      No Known Allergies     Past Surgical History:     Past Surgical History:   Procedure Laterality Date     SECTION         Social History:     Social History     Socioeconomic History    Marital status:    Tobacco Use    Smoking status: Never    Smokeless tobacco: Never   Vaping Use    Vaping Use: Never used   Substance and Sexual Activity    Alcohol use: Never    Drug use: Never    Sexual activity: Yes     Partners: Male       Family History:     Family History   Problem Relation Age of Onset    Hypertension Father     No Known Problems Mother        Review of Systems:      Review of Systems   Constitutional: Negative.  Negative for activity change, appetite change, chills, diaphoresis, fatigue and unexpected weight change.   HENT:  Negative for congestion, dental problem, drooling, ear discharge, ear pain, facial swelling, hearing loss, mouth sores, nosebleeds, postnasal drip, rhinorrhea, sinus pressure, sneezing, sore throat, tinnitus, trouble swallowing and voice change.    Eyes: Negative.  Negative for photophobia, pain, discharge, redness, itching and visual disturbance.   Respiratory: Negative.  Negative for apnea, cough, choking, chest tightness, shortness of breath, wheezing and stridor.    Cardiovascular: Negative.  Negative for chest pain, palpitations and leg swelling.   Gastrointestinal: Negative.  Negative for abdominal distention, abdominal pain, anal bleeding, blood in stool, constipation, diarrhea, nausea, rectal pain and vomiting.   Endocrine: Negative.  Negative for cold intolerance, heat intolerance, polydipsia, polyphagia and polyuria.   Musculoskeletal: Negative.  Negative for arthralgias, back pain, gait problem, joint swelling, myalgias, neck pain and neck stiffness.   Skin: Negative.  Negative for color change, pallor, rash and wound.   Allergic/Immunologic: Negative.  Negative for environmental allergies, food allergies and immunocompromised state.   Neurological: Negative.  Negative for dizziness, tremors, seizures, syncope, facial asymmetry, speech difficulty, weakness, light-headedness, numbness and headaches.   Hematological: Negative.  Negative for adenopathy. Does not bruise/bleed easily.   Psychiatric/Behavioral:  Negative for agitation, behavioral problems, confusion, decreased concentration, dysphoric mood, hallucinations, self-injury, sleep disturbance and suicidal ideas. The patient is not nervous/anxious and is not hyperactive.    All other systems reviewed and are negative.      Physical Exam:     Physical Exam  Constitutional:        Appearance: She is well-developed.   HENT:      Head: Normocephalic and atraumatic.      Right Ear: External ear normal.      Left Ear: External ear normal.   Eyes:      Conjunctiva/sclera: Conjunctivae normal.      Pupils: Pupils are equal, round, and reactive to light.   Cardiovascular:      Rate and Rhythm: Normal rate and regular rhythm.      Heart sounds: Normal heart sounds.   Pulmonary:      Effort: Pulmonary effort is normal.      Breath sounds: Normal breath sounds.   Abdominal:      General: Bowel sounds are normal. There is no distension.      Palpations: Abdomen is soft. There is no mass.      Tenderness: There is no abdominal tenderness. There is no guarding or rebound.   Genitourinary:     General: Normal vulva.      Vagina: No vaginal discharge.   Musculoskeletal:         General: Normal range of motion.   Skin:     General: Skin is warm and dry.   Neurological:      Mental Status: She is alert.      Deep Tendon Reflexes: Reflexes are normal and symmetric.   Psychiatric:         Behavior: Behavior normal.         Thought Content: Thought content normal.         Judgment: Judgment normal.         I have reviewed the following portions of the patient's history: Allergies, current medications, past family history, past medical history, past social history, past surgical history, problem list, and ROS and confirm it is accurate.    Recent Image (CT and/or KUB):      CT Abdomen and Pelvis: No results found for this or any previous visit.       CT Stone Protocol: No results found for this or any previous visit.       KUB: Results for orders placed in visit on 08/30/23    XR abdomen kub    Narrative  EXAMINATION: XR ABDOMEN KUB-    CLINICAL INDICATION: abdominal pain; R35.0-Frequency of micturition;  N30.00-Acute cystitis without hematuria; N32.81-Overactive bladder;  N32.81-Overactive bladder; R10.30-Lower abdominal pain, unspecified      COMPARISON: None available.    FINDINGS:  2 views of the  abdomen.    No suspicious calcifications are seen in the imaged area.    No evidence of bowel obstruction.    Impression  No suspicious calcifications identified on today's study.    This report was finalized on 8/31/2023 9:59 AM by Dr. Richy Jernigan MD.       Labs (past 3 months):      Office Visit on 10/10/2023   Component Date Value Ref Range Status    Urine Culture 10/10/2023 25,000 CFU/mL Streptococcus agalactiae (Group B) (A)   Final      This organism is considered to be universally susceptible to penicillin.  No further antibiotic testing will be performed.    Color, UA 10/10/2023 Yellow  Yellow, Straw Final    Appearance, UA 10/10/2023 Clear  Clear Final    pH, UA 10/10/2023 6.0  5.0 - 8.0 Final    Specific Gravity, UA 10/10/2023 1.006  1.005 - 1.030 Final    Glucose, UA 10/10/2023 Negative  Negative Final    Ketones, UA 10/10/2023 Trace (A)  Negative Final    Bilirubin, UA 10/10/2023 Negative  Negative Final    Blood, UA 10/10/2023 Negative  Negative Final    Protein, UA 10/10/2023 Negative  Negative Final    Leuk Esterase, UA 10/10/2023 Negative  Negative Final    Nitrite, UA 10/10/2023 Negative  Negative Final    Urobilinogen, UA 10/10/2023 0.2 E.U./dL  0.2 - 1.0 E.U./dL Final    RBC, UA 10/10/2023 0-2  None Seen, 0-2 /HPF Final    WBC, UA 10/10/2023 0-2  None Seen, 0-2 /HPF Final    Bacteria, UA 10/10/2023 None Seen  None Seen /HPF Final    Squamous Epithelial Cells, UA 10/10/2023 0-2  None Seen, 0-2 /HPF Final    Hyaline Casts, UA 10/10/2023 None Seen  None Seen /LPF Final    Methodology 10/10/2023 Automated Microscopy   Final   Lab on 09/06/2023   Component Date Value Ref Range Status    Urine Culture 09/06/2023 No growth   Final   Office Visit on 08/30/2023   Component Date Value Ref Range Status    Color 08/30/2023 Yellow  Yellow, Straw, Dark Yellow, Jade Final    Clarity, UA 08/30/2023 Clear  Clear Final    Specific Gravity  08/30/2023 1.010  1.005 - 1.030 Final    pH, Urine 08/30/2023 6.0  5.0 -  8.0 Final    Leukocytes 08/30/2023 Negative  Negative Final    Nitrite, UA 08/30/2023 Negative  Negative Final    Protein, POC 08/30/2023 Negative  Negative mg/dL Final    Glucose, UA 08/30/2023 Negative  Negative mg/dL Final    Ketones, UA 08/30/2023 Negative  Negative Final    Urobilinogen, UA 08/30/2023 Normal  Normal, 0.2 E.U./dL Final    Bilirubin 08/30/2023 Negative  Negative Final    Blood, UA 08/30/2023 Negative  Negative Final    Lot Number 08/30/2023 n   Final    Expiration Date 08/30/2023 n   Final    Urine Culture 08/30/2023 50,000 CFU/mL Escherichia coli (A)   Final        Procedure:   Cystoscopy:  Patient presents today for cystourethroscopy.  I went ahead and obtained an informed consent including the risks of anesthesia, bleeding, infection, etc.  After prepping and draping in a sterile fashion in the low dorsal lithotomy position, the urethra was gently anesthetized with 10 mL of 2% viscous Xylocaine jelly.  After an appropriate period of topical anesthesia, I used the Olympus digital 14 [place] flexible cystoscope to examine the anterior urethra which was completely normal.  The ureteral orifices were visualized and normal in position and configuration. There were no stones, tumors, or foreign bodies.  The blue light was enabled and was negative allowing us to see small mucosal lesions. The patient was given 80 mg of gentamicin in an intramuscular fashion as prophylaxis for the cystoscopy and released from the clinic.    Assessment/Plan:   Recurrent urinary tract infections-patient has been referred and diagnosed with recurrent urinary tract infections.  We discussed the types of organisms that are found in the urinary tract indicating that the vast majority are results of the patient's own gastrointestinal timothy.  We discussed how many of the antibiotics that are utilized can actually exacerbate these infections by creating resistant organisms and there is only very few antibiotics that are  concentrated in the urine and do not affect the rectal reservoir nor cause recurrent yeast vaginitis.  We discussed the risk factors for recurrent infections being intercourse in younger patients and atrophic changes in older patients.  We discussed the symptoms that are found including pain, pressure, burning, frequency, urgency, suprapubic pain, and painful intercourse.  I discussed upper tract symptoms including fevers and chills and indicated the workup would be much more aggressive if the patient were to present with recurrent infections in the face of upper tract symptomatology such as fever.  I discussed the history of vesicoureteral reflux in young patients and finally chronic renal scarring as a result of such.  I recommend concomitant probiotics with treatment with antibiotics to protect the rectal reservoir including over-the-counter yogurt preparations to jonas oral pills containing the appropriate probiotics.  Start Macrobid prophylaxis        This document has been electronically signed by HENRY CRUZ MD November 7, 2023 08:26 EST    Dictated Utilizing Dragon Dictation: Part of this note may be an electronic transcription/translation of spoken language to printed text using the Dragon Dictation System.

## 2023-12-14 ENCOUNTER — OFFICE VISIT (OUTPATIENT)
Dept: UROLOGY | Facility: CLINIC | Age: 48
End: 2023-12-14
Payer: COMMERCIAL

## 2023-12-14 VITALS
BODY MASS INDEX: 22.14 KG/M2 | SYSTOLIC BLOOD PRESSURE: 121 MMHG | HEIGHT: 66 IN | DIASTOLIC BLOOD PRESSURE: 76 MMHG | HEART RATE: 83 BPM | WEIGHT: 137.8 LBS

## 2023-12-14 DIAGNOSIS — N39.0 RECURRENT UTI (URINARY TRACT INFECTION): Primary | ICD-10-CM

## 2023-12-14 DIAGNOSIS — N30.00 ACUTE CYSTITIS WITHOUT HEMATURIA: ICD-10-CM

## 2023-12-14 PROCEDURE — 99213 OFFICE O/P EST LOW 20 MIN: CPT | Performed by: UROLOGY

## 2023-12-14 RX ORDER — GRANULES FOR ORAL 3 G/1
3 POWDER ORAL 3 TIMES WEEKLY
Qty: 36 G | Refills: 6 | Status: SHIPPED | OUTPATIENT
Start: 2023-12-15

## 2023-12-14 NOTE — PROGRESS NOTES
Chief Complaint:      Chief Complaint   Patient presents with    Urinary Tract Infection       HPI:   48 y.o. female returns today she has persistent suprapubic pain, discomfort, severe dyspareunia, no nausea no vomiting.  She passed out with stomach pain and had diarrhea bowel movements nightly she is on probiotics, B complex, slippery elm, d-mannose, CT was reviewed.  She sees Dr. Villegas I would change her Macrobid to fosfomycin I have a urine culture pending    Past Medical History:     Past Medical History:   Diagnosis Date    Recurrent UTI        Current Meds:     Current Outpatient Medications   Medication Sig Dispense Refill    amitriptyline (ELAVIL) 10 MG tablet Take 1 tablet by mouth Every Night. 30 tablet 1    calcium polycarbophil (FiberCon) 625 MG tablet Take 1 tablet by mouth 2 (Two) Times a Day. 60 tablet 3    nitrofurantoin, macrocrystal-monohydrate, (Macrobid) 100 MG capsule Take 1 capsule by mouth 2 (Two) Times a Day. 20 capsule 0    nitrofurantoin, macrocrystal-monohydrate, (Macrobid) 100 MG capsule TAKE 1 CAPSULE BY MOUTH TWICE DAILY X 10 DAYS, AFTER, CONTINUETO TAKE 1 CAPSULE NIGHTLY FOR SUPPRESSIVE THERAPY E'COLI RECURRENT UTIs 56 capsule 3    ondansetron (Zofran) 4 MG tablet Take 1 tablet by mouth Every 12 (Twelve) Hours As Needed for Nausea. 20 tablet 0    phenazopyridine (PYRIDIUM) 200 MG tablet Take 1 tablet by mouth 3 (Three) Times a Day As Needed for Bladder Spasms. 60 tablet 3     No current facility-administered medications for this visit.        Allergies:      No Known Allergies     Past Surgical History:     Past Surgical History:   Procedure Laterality Date     SECTION         Social History:     Social History     Socioeconomic History    Marital status:    Tobacco Use    Smoking status: Never    Smokeless tobacco: Never   Vaping Use    Vaping Use: Never used   Substance and Sexual Activity    Alcohol use: Never    Drug use: Never    Sexual activity: Yes     Partners:  Male       Family History:     Family History   Problem Relation Age of Onset    Hypertension Father     No Known Problems Mother        Review of Systems:     Review of Systems   Constitutional: Negative.  Negative for activity change, appetite change, chills, diaphoresis, fatigue and unexpected weight change.   HENT:  Negative for congestion, dental problem, drooling, ear discharge, ear pain, facial swelling, hearing loss, mouth sores, nosebleeds, postnasal drip, rhinorrhea, sinus pressure, sneezing, sore throat, tinnitus, trouble swallowing and voice change.    Eyes: Negative.  Negative for photophobia, pain, discharge, redness, itching and visual disturbance.   Respiratory: Negative.  Negative for apnea, cough, choking, chest tightness, shortness of breath, wheezing and stridor.    Cardiovascular: Negative.  Negative for chest pain, palpitations and leg swelling.   Gastrointestinal: Negative.  Negative for abdominal distention, abdominal pain, anal bleeding, blood in stool, constipation, diarrhea, nausea, rectal pain and vomiting.   Endocrine: Negative.  Negative for cold intolerance, heat intolerance, polydipsia, polyphagia and polyuria.   Genitourinary:  Positive for difficulty urinating, dyspareunia, dysuria, flank pain, pelvic pain, urgency and vaginal pain.   Musculoskeletal:  Negative for arthralgias, back pain, gait problem, joint swelling, myalgias, neck pain and neck stiffness.   Skin: Negative.  Negative for color change, pallor, rash and wound.   Allergic/Immunologic: Negative.  Negative for environmental allergies, food allergies and immunocompromised state.   Neurological: Negative.  Negative for dizziness, tremors, seizures, syncope, facial asymmetry, speech difficulty, weakness, light-headedness, numbness and headaches.   Hematological: Negative.  Negative for adenopathy. Does not bruise/bleed easily.   Psychiatric/Behavioral:  Negative for agitation, behavioral problems, confusion, decreased  concentration, dysphoric mood, hallucinations, self-injury, sleep disturbance and suicidal ideas. The patient is not nervous/anxious and is not hyperactive.    All other systems reviewed and are negative.      Physical Exam:     Physical Exam  Constitutional:       Appearance: She is well-developed.   HENT:      Head: Normocephalic and atraumatic.      Right Ear: External ear normal.      Left Ear: External ear normal.   Eyes:      Conjunctiva/sclera: Conjunctivae normal.      Pupils: Pupils are equal, round, and reactive to light.   Cardiovascular:      Rate and Rhythm: Normal rate and regular rhythm.      Heart sounds: Normal heart sounds.   Pulmonary:      Effort: Pulmonary effort is normal.      Breath sounds: Normal breath sounds.   Abdominal:      General: Bowel sounds are normal. There is no distension.      Palpations: Abdomen is soft. There is no mass.      Tenderness: There is no abdominal tenderness. There is no guarding or rebound.   Genitourinary:     Vagina: No vaginal discharge.   Musculoskeletal:         General: Normal range of motion.   Skin:     General: Skin is warm and dry.   Neurological:      Mental Status: She is alert.      Deep Tendon Reflexes: Reflexes are normal and symmetric.   Psychiatric:         Behavior: Behavior normal.         Thought Content: Thought content normal.         Judgment: Judgment normal.         I have reviewed the following portions of the patient's history: Allergies, current medications, past family history, past medical history, past social history, past surgical history, problem list, and ROS and confirm it is accurate.    Recent Image (CT and/or KUB):      CT Abdomen and Pelvis: No results found for this or any previous visit.       CT Stone Protocol: Results for orders placed during the hospital encounter of 11/07/23    CT Abdomen Pelvis Stone Protocol    Narrative  EXAM:  CT Abdomen and Pelvis Without Intravenous Contrast    EXAM DATE:  11/7/2023 10:40  AM    CLINICAL HISTORY:  Nephrolithiasis; A49.8-Other bacterial infections of unspecified site    TECHNIQUE:  Axial computed tomography images of the abdomen and pelvis without  intravenous contrast.  Sagittal and coronal reformatted images were  created and reviewed.  This CT exam was performed using one or more of  the following dose reduction techniques:  automated exposure control,  adjustment of the mA and/or kV according to patient size, and/or use of  iterative reconstruction technique.    COMPARISON:  No relevant prior studies available.    FINDINGS:  Lung bases:  Unremarkable as visualized.  No mass.  No consolidation.    ABDOMEN:  Liver:  2.4 cm low-density lesion right lobe liver, segment 7. Further  evaluation with liver protocol CT or MRI is recommended. Statistically,  favors benign process such as hemangioma.  Gallbladder and bile ducts:  Unremarkable as visualized.  No calcified  stones.  No ductal dilation.  Pancreas:  Unremarkable as visualized.  No ductal dilation.  Spleen:  Unremarkable as visualized.  No splenomegaly.  Adrenals:  Unremarkable as visualized.  No mass.  Kidneys and ureters:  5 mm fat based lesion of the inferior pole right  kidney consistent with small benign angiomyolipoma.  No renal or  ureteral stones. No obstructive uropathy.  Stomach and bowel:  Unremarkable as visualized.  No obstruction.  No  mucosal thickening.    PELVIS:  Appendix:  Unremarkable appearance of the appendix.  Bladder:  Air in the urinary bladder which may be iatrogenic or could  be seen in association with cystitis.  No stones.  Mild urinary bladder  wall thickening also noted increasing suspicion for cystitis.  Reproductive:  Unremarkable as visualized.    ABDOMEN and PELVIS:  Intraperitoneal space:  Unremarkable as visualized.  No free air.  No  significant fluid collection.  Bones/joints:  No acute fracture.  No dislocation.  Soft tissues:  Unremarkable as visualized.  Vasculature:  Unremarkable as  visualized.  No abdominal aortic  aneurysm.  Lymph nodes:  Unremarkable as visualized.  No enlarged lymph nodes.    Impression  1.  No renal or ureteral stones. No obstructive uropathy.  2.  Air in the urinary bladder which may be iatrogenic or could be seen  in association with cystitis. Mild urinary bladder wall thickening also  noted increasing suspicion for cystitis.  3.  5 mm fat based lesion of the inferior pole right kidney consistent  with small benign angiomyolipoma.  4.  2.4 cm low-density lesion right lobe liver, segment 7. Further  evaluation with liver protocol CT or MRI is recommended. Statistically,  favors benign process such as hemangioma.      This report was finalized on 11/7/2023 10:29 AM by Dr. Serafin Taylor MD.       KUB: Results for orders placed in visit on 08/30/23    XR abdomen kub    Narrative  EXAMINATION: XR ABDOMEN KUB-    CLINICAL INDICATION: abdominal pain; R35.0-Frequency of micturition;  N30.00-Acute cystitis without hematuria; N32.81-Overactive bladder;  N32.81-Overactive bladder; R10.30-Lower abdominal pain, unspecified      COMPARISON: None available.    FINDINGS:  2 views of the abdomen.    No suspicious calcifications are seen in the imaged area.    No evidence of bowel obstruction.    Impression  No suspicious calcifications identified on today's study.    This report was finalized on 8/31/2023 9:59 AM by Dr. Richy Jernigan MD.       Labs (past 3 months):      Office Visit on 10/10/2023   Component Date Value Ref Range Status    Urine Culture 10/10/2023 25,000 CFU/mL Streptococcus agalactiae (Group B) (A)   Final      This organism is considered to be universally susceptible to penicillin.  No further antibiotic testing will be performed.    Color, UA 10/10/2023 Yellow  Yellow, Straw Final    Appearance, UA 10/10/2023 Clear  Clear Final    pH, UA 10/10/2023 6.0  5.0 - 8.0 Final    Specific Gravity, UA 10/10/2023 1.006  1.005 - 1.030 Final    Glucose, UA 10/10/2023 Negative   Negative Final    Ketones, UA 10/10/2023 Trace (A)  Negative Final    Bilirubin, UA 10/10/2023 Negative  Negative Final    Blood, UA 10/10/2023 Negative  Negative Final    Protein, UA 10/10/2023 Negative  Negative Final    Leuk Esterase, UA 10/10/2023 Negative  Negative Final    Nitrite, UA 10/10/2023 Negative  Negative Final    Urobilinogen, UA 10/10/2023 0.2 E.U./dL  0.2 - 1.0 E.U./dL Final    RBC, UA 10/10/2023 0-2  None Seen, 0-2 /HPF Final    WBC, UA 10/10/2023 0-2  None Seen, 0-2 /HPF Final    Bacteria, UA 10/10/2023 None Seen  None Seen /HPF Final    Squamous Epithelial Cells, UA 10/10/2023 0-2  None Seen, 0-2 /HPF Final    Hyaline Casts, UA 10/10/2023 None Seen  None Seen /LPF Final    Methodology 10/10/2023 Automated Microscopy   Final        Procedure:       Assessment/Plan:   Recurrent urinary tract infections-patient has been referred and diagnosed with recurrent urinary tract infections.  We discussed the types of organisms that are found in the urinary tract indicating that the vast majority are results of the patient's own gastrointestinal timothy.  We discussed how many of the antibiotics that are utilized can actually exacerbate these infections by creating resistant organisms and there is only very few antibiotics that are concentrated in the urine and do not affect the rectal reservoir nor cause recurrent yeast vaginitis.  We discussed the risk factors for recurrent infections being intercourse in younger patients and atrophic changes in older patients.  We discussed the symptoms that are found including pain, pressure, burning, frequency, urgency, suprapubic pain, and painful intercourse.  I discussed upper tract symptoms including fevers and chills and indicated the workup would be much more aggressive if the patient were to present with recurrent infections in the face of upper tract symptomatology such as fever.  I discussed the history of vesicoureteral reflux in young patients and finally  chronic renal scarring as a result of such.  I recommend concomitant probiotics with treatment with antibiotics to protect the rectal reservoir including over-the-counter yogurt preparations to jonas oral pills containing the appropriate probiotics.  When to change her from Macrobid which she feels is ineffective to fosfomycin 3 times weekly              This document has been electronically signed by HENRY CRUZ MD December 14, 2023 08:15 EST    Dictated Utilizing Dragon Dictation: Part of this note may be an electronic transcription/translation of spoken language to printed text using the Dragon Dictation System.

## 2023-12-18 ENCOUNTER — TELEPHONE (OUTPATIENT)
Dept: UROLOGY | Facility: CLINIC | Age: 48
End: 2023-12-18
Payer: COMMERCIAL

## 2023-12-18 NOTE — TELEPHONE ENCOUNTER
I called and let her know.    ----- Message from Ben Curtis MD sent at 12/18/2023  9:55 AM EST -----  Notify patient that the MDX was completely negative  ----- Message -----  From: Lucina Ayala Incoming  Sent: 12/18/2023   9:01 AM EST  To: Ben Curtis MD

## 2024-01-22 ENCOUNTER — OFFICE VISIT (OUTPATIENT)
Dept: UROLOGY | Facility: CLINIC | Age: 49
End: 2024-01-22
Payer: COMMERCIAL

## 2024-01-22 VITALS
SYSTOLIC BLOOD PRESSURE: 122 MMHG | DIASTOLIC BLOOD PRESSURE: 79 MMHG | HEIGHT: 66 IN | WEIGHT: 139.6 LBS | BODY MASS INDEX: 22.43 KG/M2 | HEART RATE: 73 BPM

## 2024-01-22 DIAGNOSIS — B96.20 E-COLI UTI: ICD-10-CM

## 2024-01-22 DIAGNOSIS — N39.0 E-COLI UTI: ICD-10-CM

## 2024-01-22 DIAGNOSIS — N39.0 RECURRENT UTI (URINARY TRACT INFECTION): Primary | ICD-10-CM

## 2024-01-22 PROCEDURE — 99213 OFFICE O/P EST LOW 20 MIN: CPT | Performed by: UROLOGY

## 2024-01-22 RX ORDER — NITROFURANTOIN 25; 75 MG/1; MG/1
100 CAPSULE ORAL DAILY
Qty: 60 CAPSULE | Refills: 3 | Status: SHIPPED | OUTPATIENT
Start: 2024-01-22

## 2024-01-22 NOTE — PROGRESS NOTES
Chief Complaint:      Chief Complaint   Patient presents with    RECURRENT UTI        HPI:   48 y.o. female returns today.  History of recurrent fracture.  Wants a MDX culture.  She stopped her fosfomycin.  She is eating fermented foods.  She is moving to [place] in the [place].  She does not have intercourse she has no dyspareunia intermittent flank pain and occasional suprapubic pain culture pending I would have her be maintained on prophylaxis.    Past Medical History:     Past Medical History:   Diagnosis Date    Recurrent UTI        Current Meds:     Current Outpatient Medications   Medication Sig Dispense Refill    amitriptyline (ELAVIL) 10 MG tablet Take 1 tablet by mouth Every Night. 30 tablet 1    calcium polycarbophil (FiberCon) 625 MG tablet Take 1 tablet by mouth 2 (Two) Times a Day. 60 tablet 3    fosfomycin (MONUROL) 3 g pack Take 3 g by mouth 3 (Three) Times a Week. 36 g 6    nitrofurantoin, macrocrystal-monohydrate, (Macrobid) 100 MG capsule TAKE 1 CAPSULE BY MOUTH TWICE DAILY X 10 DAYS, AFTER, CONTINUETO TAKE 1 CAPSULE NIGHTLY FOR SUPPRESSIVE THERAPY E'COLI RECURRENT UTIs 56 capsule 3    ondansetron (Zofran) 4 MG tablet Take 1 tablet by mouth Every 12 (Twelve) Hours As Needed for Nausea. 20 tablet 0    phenazopyridine (PYRIDIUM) 200 MG tablet Take 1 tablet by mouth 3 (Three) Times a Day As Needed for Bladder Spasms. 60 tablet 3    nitrofurantoin, macrocrystal-monohydrate, (Macrobid) 100 MG capsule Take 1 capsule by mouth 2 (Two) Times a Day. (Patient not taking: Reported on 2024) 20 capsule 0     No current facility-administered medications for this visit.        Allergies:      No Known Allergies     Past Surgical History:     Past Surgical History:   Procedure Laterality Date     SECTION         Social History:     Social History     Socioeconomic History    Marital status:    Tobacco Use    Smoking status: Never    Smokeless tobacco: Never   Vaping Use    Vaping Use:  Never used   Substance and Sexual Activity    Alcohol use: Never    Drug use: Never    Sexual activity: Yes     Partners: Male       Family History:     Family History   Problem Relation Age of Onset    Hypertension Father     No Known Problems Mother        Review of Systems:     Review of Systems   Constitutional: Negative.  Negative for activity change, appetite change, chills, diaphoresis, fatigue and unexpected weight change.   HENT:  Negative for congestion, dental problem, drooling, ear discharge, ear pain, facial swelling, hearing loss, mouth sores, nosebleeds, postnasal drip, rhinorrhea, sinus pressure, sneezing, sore throat, tinnitus, trouble swallowing and voice change.    Eyes: Negative.  Negative for photophobia, pain, discharge, redness, itching and visual disturbance.   Respiratory: Negative.  Negative for apnea, cough, choking, chest tightness, shortness of breath, wheezing and stridor.    Cardiovascular: Negative.  Negative for chest pain, palpitations and leg swelling.   Gastrointestinal: Negative.  Negative for abdominal distention, abdominal pain, anal bleeding, blood in stool, constipation, diarrhea, nausea, rectal pain and vomiting.   Endocrine: Negative.  Negative for cold intolerance, heat intolerance, polydipsia, polyphagia and polyuria.   Musculoskeletal: Negative.  Negative for arthralgias, back pain, gait problem, joint swelling, myalgias, neck pain and neck stiffness.   Skin: Negative.  Negative for color change, pallor, rash and wound.   Allergic/Immunologic: Negative.  Negative for environmental allergies, food allergies and immunocompromised state.   Neurological: Negative.  Negative for dizziness, tremors, seizures, syncope, facial asymmetry, speech difficulty, weakness, light-headedness, numbness and headaches.   Hematological: Negative.  Negative for adenopathy. Does not bruise/bleed easily.   Psychiatric/Behavioral:  Negative for agitation, behavioral problems, confusion,  decreased concentration, dysphoric mood, hallucinations, self-injury, sleep disturbance and suicidal ideas. The patient is not nervous/anxious and is not hyperactive.    All other systems reviewed and are negative.      Physical Exam:     Physical Exam  Constitutional:       Appearance: She is well-developed.   HENT:      Head: Normocephalic and atraumatic.      Right Ear: External ear normal.      Left Ear: External ear normal.   Eyes:      Conjunctiva/sclera: Conjunctivae normal.      Pupils: Pupils are equal, round, and reactive to light.   Cardiovascular:      Rate and Rhythm: Normal rate and regular rhythm.      Heart sounds: Normal heart sounds.   Pulmonary:      Effort: Pulmonary effort is normal.      Breath sounds: Normal breath sounds.   Abdominal:      General: Bowel sounds are normal. There is no distension.      Palpations: Abdomen is soft. There is no mass.      Tenderness: There is no abdominal tenderness. There is no guarding or rebound.   Genitourinary:     Vagina: No vaginal discharge.   Musculoskeletal:         General: Normal range of motion.   Skin:     General: Skin is warm and dry.   Neurological:      Mental Status: She is alert.      Deep Tendon Reflexes: Reflexes are normal and symmetric.   Psychiatric:         Behavior: Behavior normal.         Thought Content: Thought content normal.         Judgment: Judgment normal.         I have reviewed the following portions of the patient's history: Allergies, current medications, past family history, past medical history, past social history, past surgical history, problem list, and ROS and confirm it is accurate.    Recent Image (CT and/or KUB):      CT Abdomen and Pelvis: No results found for this or any previous visit.       CT Stone Protocol: Results for orders placed during the hospital encounter of 11/07/23    CT Abdomen Pelvis Stone Protocol    Narrative  EXAM:  CT Abdomen and Pelvis Without Intravenous Contrast    EXAM DATE:  11/7/2023  10:40 AM    CLINICAL HISTORY:  Nephrolithiasis; A49.8-Other bacterial infections of unspecified site    TECHNIQUE:  Axial computed tomography images of the abdomen and pelvis without  intravenous contrast.  Sagittal and coronal reformatted images were  created and reviewed.  This CT exam was performed using one or more of  the following dose reduction techniques:  automated exposure control,  adjustment of the mA and/or kV according to patient size, and/or use of  iterative reconstruction technique.    COMPARISON:  No relevant prior studies available.    FINDINGS:  Lung bases:  Unremarkable as visualized.  No mass.  No consolidation.    ABDOMEN:  Liver:  2.4 cm low-density lesion right lobe liver, segment 7. Further  evaluation with liver protocol CT or MRI is recommended. Statistically,  favors benign process such as hemangioma.  Gallbladder and bile ducts:  Unremarkable as visualized.  No calcified  stones.  No ductal dilation.  Pancreas:  Unremarkable as visualized.  No ductal dilation.  Spleen:  Unremarkable as visualized.  No splenomegaly.  Adrenals:  Unremarkable as visualized.  No mass.  Kidneys and ureters:  5 mm fat based lesion of the inferior pole right  kidney consistent with small benign angiomyolipoma.  No renal or  ureteral stones. No obstructive uropathy.  Stomach and bowel:  Unremarkable as visualized.  No obstruction.  No  mucosal thickening.    PELVIS:  Appendix:  Unremarkable appearance of the appendix.  Bladder:  Air in the urinary bladder which may be iatrogenic or could  be seen in association with cystitis.  No stones.  Mild urinary bladder  wall thickening also noted increasing suspicion for cystitis.  Reproductive:  Unremarkable as visualized.    ABDOMEN and PELVIS:  Intraperitoneal space:  Unremarkable as visualized.  No free air.  No  significant fluid collection.  Bones/joints:  No acute fracture.  No dislocation.  Soft tissues:  Unremarkable as visualized.  Vasculature:  Unremarkable as  visualized.  No abdominal aortic  aneurysm.  Lymph nodes:  Unremarkable as visualized.  No enlarged lymph nodes.    Impression  1.  No renal or ureteral stones. No obstructive uropathy.  2.  Air in the urinary bladder which may be iatrogenic or could be seen  in association with cystitis. Mild urinary bladder wall thickening also  noted increasing suspicion for cystitis.  3.  5 mm fat based lesion of the inferior pole right kidney consistent  with small benign angiomyolipoma.  4.  2.4 cm low-density lesion right lobe liver, segment 7. Further  evaluation with liver protocol CT or MRI is recommended. Statistically,  favors benign process such as hemangioma.      This report was finalized on 11/7/2023 10:29 AM by Dr. Serafin Taylor MD.       KUB: Results for orders placed in visit on 08/30/23    XR abdomen kub    Narrative  EXAMINATION: XR ABDOMEN KUB-    CLINICAL INDICATION: abdominal pain; R35.0-Frequency of micturition;  N30.00-Acute cystitis without hematuria; N32.81-Overactive bladder;  N32.81-Overactive bladder; R10.30-Lower abdominal pain, unspecified      COMPARISON: None available.    FINDINGS:  2 views of the abdomen.    No suspicious calcifications are seen in the imaged area.    No evidence of bowel obstruction.    Impression  No suspicious calcifications identified on today's study.    This report was finalized on 8/31/2023 9:59 AM by Dr. Richy Jernigan MD.       Labs (past 3 months):      No visits with results within 3 Month(s) from this visit.   Latest known visit with results is:   Office Visit on 10/10/2023   Component Date Value Ref Range Status    Urine Culture 10/10/2023 25,000 CFU/mL Streptococcus agalactiae (Group B) (A)   Final      This organism is considered to be universally susceptible to penicillin.  No further antibiotic testing will be performed.    Color, UA 10/10/2023 Yellow  Yellow, Straw Final    Appearance, UA 10/10/2023 Clear  Clear Final    pH, UA 10/10/2023 6.0  5.0 - 8.0 Final     Specific [place], UA 10/10/2023 1.006  1.005 - 1.030 Final    Glucose, UA 10/10/2023 Negative  Negative Final    Ketones, UA 10/10/2023 Trace (A)  Negative Final    Bilirubin, UA 10/10/2023 Negative  Negative Final    Blood, UA 10/10/2023 Negative  Negative Final    Protein, UA 10/10/2023 Negative  Negative Final    Leuk Esterase, UA 10/10/2023 Negative  Negative Final    Nitrite, UA 10/10/2023 Negative  Negative Final    Urobilinogen, UA 10/10/2023 0.2 E.U./dL  0.2 - 1.0 E.U./dL Final    RBC, UA 10/10/2023 0-2  None Seen, 0-2 /HPF Final    WBC, UA 10/10/2023 0-2  None Seen, 0-2 /HPF Final    Bacteria, UA 10/10/2023 None Seen  None Seen /HPF Final    Squamous Epithelial Cells, UA 10/10/2023 0-2  None Seen, 0-2 /HPF Final    Hyaline Casts, UA 10/10/2023 None Seen  None Seen /LPF Final    Methodology 10/10/2023 Automated Microscopy   Final        Procedure:       Assessment/Plan:   Recurrent urinary tract infections-patient has been referred and diagnosed with recurrent urinary tract infections.  We discussed the types of organisms that are found in the urinary tract indicating that the vast majority are results of the patient's own gastrointestinal timothy.  We discussed how many of the antibiotics that are utilized can actually exacerbate these infections by creating resistant organisms and there is only very few antibiotics that are concentrated in the urine and do not affect the rectal reservoir nor cause recurrent yeast vaginitis.  We discussed the risk factors for recurrent infections being intercourse in younger patients and atrophic changes in older patients.  We discussed the symptoms that are found including pain, pressure, burning, frequency, urgency, suprapubic pain, and painful intercourse.  I discussed upper tract symptoms including fevers and chills and indicated the workup would be much more aggressive if the patient were to present with recurrent infections in the face of upper tract symptomatology  such as fever.  I discussed the history of vesicoureteral reflux in young patients and finally chronic renal scarring as a result of such.  I recommend concomitant probiotics with treatment with antibiotics to protect the rectal reservoir including over-the-counter yogurt preparations to jonas oral pills containing the appropriate probiotics.  Culture pending she is moving to the [place] area          This document has been electronically signed by HENRY CRUZ MD January 22, 2024 15:11 EST    Dictated Utilizing Dragon Dictation: Part of this note may be an electronic transcription/translation of spoken language to printed text using the Dragon Dictation System.

## 2024-01-29 ENCOUNTER — TELEPHONE (OUTPATIENT)
Dept: UROLOGY | Facility: CLINIC | Age: 49
End: 2024-01-29
Payer: COMMERCIAL

## 2024-01-29 NOTE — TELEPHONE ENCOUNTER
I called the pt and let her know that her MDX came back in and it was positive and that she could stay on Macrobid due to it being sensitive to that.    ----- Message from Ben Curtis MD sent at 1/29/2024 10:03 AM EST -----  Grew Klebsiella sensitive to everything recommend stay on Macrobid  ----- Message -----  From: Interface, Scans Incoming  Sent: 1/29/2024   9:36 AM EST  To: Ben Curtis MD